# Patient Record
Sex: FEMALE | Race: WHITE | NOT HISPANIC OR LATINO | Employment: FULL TIME | RURAL
[De-identification: names, ages, dates, MRNs, and addresses within clinical notes are randomized per-mention and may not be internally consistent; named-entity substitution may affect disease eponyms.]

---

## 2018-06-05 ENCOUNTER — HISTORICAL (OUTPATIENT)
Dept: ADMINISTRATIVE | Facility: HOSPITAL | Age: 43
End: 2018-06-05

## 2018-06-07 LAB
LAB AP GENERAL CAT - HISTORICAL: NORMAL
LAB AP INTERPRETATION/RESULT - HISTORICAL: NEGATIVE
LAB AP SPECIMEN ADEQUACY - HISTORICAL: NORMAL
LAB AP SPECIMEN SUBMITTED - HISTORICAL: NORMAL

## 2018-07-09 ENCOUNTER — HISTORICAL (OUTPATIENT)
Dept: ADMINISTRATIVE | Facility: HOSPITAL | Age: 43
End: 2018-07-09

## 2018-07-11 LAB
LAB AP CLINICAL INFORMATION: NORMAL
LAB AP DIAGNOSIS - HISTORICAL: NORMAL
LAB AP GROSS PATHOLOGY - HISTORICAL: NORMAL
LAB AP SPECIMEN SUBMITTED - HISTORICAL: NORMAL

## 2018-07-18 ENCOUNTER — HISTORICAL (OUTPATIENT)
Dept: ADMINISTRATIVE | Facility: HOSPITAL | Age: 43
End: 2018-07-18

## 2020-11-24 ENCOUNTER — HISTORICAL (OUTPATIENT)
Dept: ADMINISTRATIVE | Facility: HOSPITAL | Age: 45
End: 2020-11-24

## 2020-11-24 LAB
ALBUMIN SERPL BCP-MCNC: 4.1 G/DL (ref 3.4–5)
ALBUMIN/GLOB SERPL: 1 {RATIO}
ALP SERPL-CCNC: 154 U/L (ref 50–136)
ALT SERPL W P-5'-P-CCNC: 32 U/L (ref 12–78)
ANION GAP SERPL CALCULATED.3IONS-SCNC: 14 MMOL/L
AST SERPL W P-5'-P-CCNC: 24 U/L (ref 15–37)
BACTERIA #/AREA URNS HPF: ABNORMAL /HPF
BASOPHILS # BLD AUTO: 0.03 X10E3/UL (ref 0–0.2)
BASOPHILS NFR BLD AUTO: 0.3 % (ref 0–1)
BILIRUB SERPL-MCNC: 0.3 MG/DL (ref 0.2–1)
BILIRUB UR QL STRIP: NEGATIVE MG/DL
BUN SERPL-MCNC: 19 MG/DL (ref 7–18)
CALCIUM SERPL-MCNC: 8.4 MG/DL (ref 8.5–10.1)
CAOX CRY #/AREA URNS LPF: ABNORMAL /LPF
CHLORIDE SERPL-SCNC: 107 MMOL/L (ref 101–111)
CLARITY UR: ABNORMAL
CLARITY UR: ABNORMAL
CO2 SERPL-SCNC: 28 MMOL/L (ref 21–32)
COLOR UR: YELLOW
COLOR UR: YELLOW
CREAT SERPL-MCNC: 1.2 MG/DL (ref 0.6–1.3)
EOSINOPHIL # BLD AUTO: 0.12 X10E3/UL (ref 0–0.5)
EOSINOPHIL NFR BLD AUTO: 1.2 % (ref 1–4)
ERYTHROCYTE [DISTWIDTH] IN BLOOD BY AUTOMATED COUNT: 14.6 % (ref 11.5–14.5)
GLOBULIN SER-MCNC: 3.5 G/DL
GLUCOSE SERPL-MCNC: 129 MG/DL (ref 74–106)
GLUCOSE UR STRIP-MCNC: NORMAL MG/DL
HCT VFR BLD AUTO: 39.5 % (ref 38–47)
HGB BLD-MCNC: 12.8 G/DL (ref 12–16)
KETONES UR STRIP-SCNC: NEGATIVE MG/DL
LEUKOCYTE ESTERASE UR QL STRIP: ABNORMAL LEU/UL
LYMPHOCYTES # BLD AUTO: 2.94 X10E3/UL (ref 1–4.8)
LYMPHOCYTES NFR BLD AUTO: 30.3 % (ref 27–41)
MCH RBC QN AUTO: 27.8 PG (ref 27–31)
MCHC RBC AUTO-ENTMCNC: 32.4 G/DL (ref 32–36)
MCV RBC AUTO: 86 FL (ref 80–96)
MONOCYTES # BLD AUTO: 0.7 X10E3/UL (ref 0–0.8)
MONOCYTES NFR BLD AUTO: 7.2 % (ref 2–6)
MPC BLD CALC-MCNC: 9.8 FL (ref 9.4–12.4)
MUCOUS THREADS #/AREA URNS HPF: ABNORMAL /HPF
NEUTROPHILS # BLD AUTO: 5.91 X10E3/UL (ref 1.8–7.7)
NEUTROPHILS NFR BLD AUTO: 61 % (ref 53–65)
NITRITE UR QL STRIP: NEGATIVE
PH UR STRIP: 6 PH UNITS (ref 5–8)
PLATELET # BLD AUTO: 365 X10E3/UL (ref 150–400)
POTASSIUM SERPL-SCNC: 4.3 MMOL/L (ref 3.6–5)
PROT SERPL-MCNC: 7.6 G/DL (ref 6.4–8.2)
PROT UR QL STRIP: ABNORMAL MG/DL
RBC # BLD AUTO: 4.6 X10E6/UL (ref 4.2–5.4)
RBC # UR STRIP: ABNORMAL ERY/UL
RBC #/AREA URNS HPF: ABNORMAL /HPF (ref 0–3)
SODIUM SERPL-SCNC: 145 MMOL/L (ref 135–145)
SP GR UR STRIP: >=1.03 (ref 1–1.03)
SQUAMOUS #/AREA URNS LPF: ABNORMAL /LPF
UROBILINOGEN UR STRIP-ACNC: 0.2 MG/DL
WBC # BLD AUTO: 9.7 X10E3/UL (ref 4.5–11)
WBC #/AREA URNS HPF: ABNORMAL /HPF (ref 0–5)

## 2020-11-25 ENCOUNTER — HISTORICAL (OUTPATIENT)
Dept: ADMINISTRATIVE | Facility: HOSPITAL | Age: 45
End: 2020-11-25

## 2020-11-27 LAB
REPORT: NORMAL

## 2021-05-19 RX ORDER — ERENUMAB-AOOE 70 MG/ML
70 INJECTION SUBCUTANEOUS
Qty: 3 SYRINGE | Refills: 1 | Status: SHIPPED | OUTPATIENT
Start: 2021-05-19 | End: 2021-08-09

## 2021-06-23 DIAGNOSIS — G43.919 INTRACTABLE MIGRAINE WITHOUT STATUS MIGRAINOSUS, UNSPECIFIED MIGRAINE TYPE: Primary | ICD-10-CM

## 2021-06-23 RX ORDER — SUMATRIPTAN SUCCINATE 100 MG/1
100 TABLET ORAL DAILY
Qty: 30 TABLET | Refills: 5 | Status: SHIPPED | OUTPATIENT
Start: 2021-06-23 | End: 2021-08-09

## 2021-06-23 RX ORDER — SUMATRIPTAN SUCCINATE 100 MG/1
1 TABLET ORAL DAILY
COMMUNITY
Start: 2021-04-26 | End: 2021-06-23 | Stop reason: SDUPTHER

## 2021-08-09 ENCOUNTER — OFFICE VISIT (OUTPATIENT)
Dept: FAMILY MEDICINE | Facility: CLINIC | Age: 46
End: 2021-08-09
Payer: OTHER GOVERNMENT

## 2021-08-09 VITALS
TEMPERATURE: 97 F | HEART RATE: 82 BPM | OXYGEN SATURATION: 97 % | SYSTOLIC BLOOD PRESSURE: 118 MMHG | HEIGHT: 65 IN | RESPIRATION RATE: 18 BRPM | DIASTOLIC BLOOD PRESSURE: 78 MMHG | BODY MASS INDEX: 33.32 KG/M2 | WEIGHT: 200 LBS

## 2021-08-09 DIAGNOSIS — U07.1 COVID-19: Primary | ICD-10-CM

## 2021-08-09 DIAGNOSIS — R05.9 COUGH: ICD-10-CM

## 2021-08-09 DIAGNOSIS — Z20.828 EXPOSURE TO SARS-ASSOCIATED CORONAVIRUS: ICD-10-CM

## 2021-08-09 LAB
CTP QC/QA: YES
FLUAV AG NPH QL: NEGATIVE
FLUBV AG NPH QL: NEGATIVE
SARS-COV-2 AG RESP QL IA.RAPID: POSITIVE

## 2021-08-09 PROCEDURE — 99213 PR OFFICE/OUTPT VISIT, EST, LEVL III, 20-29 MIN: ICD-10-PCS | Mod: ,,, | Performed by: NURSE PRACTITIONER

## 2021-08-09 PROCEDURE — 87428 SARSCOV & INF VIR A&B AG IA: CPT | Mod: QW,,, | Performed by: NURSE PRACTITIONER

## 2021-08-09 PROCEDURE — 87428 POCT SARS-COV2 (COVID) WITH FLU ANTIGEN: ICD-10-PCS | Mod: QW,,, | Performed by: NURSE PRACTITIONER

## 2021-08-09 PROCEDURE — 99213 OFFICE O/P EST LOW 20 MIN: CPT | Mod: ,,, | Performed by: NURSE PRACTITIONER

## 2021-08-09 RX ORDER — ALBUTEROL SULFATE 90 UG/1
2 AEROSOL, METERED RESPIRATORY (INHALATION) EVERY 6 HOURS PRN
Qty: 18 G | Refills: 0 | Status: SHIPPED | OUTPATIENT
Start: 2021-08-09 | End: 2022-09-06

## 2021-08-09 RX ORDER — RIZATRIPTAN BENZOATE 10 MG/1
TABLET, ORALLY DISINTEGRATING ORAL
COMMUNITY
Start: 2021-07-28 | End: 2021-12-06 | Stop reason: SDUPTHER

## 2021-08-09 RX ORDER — METHYLPREDNISOLONE 4 MG/1
TABLET ORAL
Qty: 1 PACKAGE | Refills: 0 | Status: SHIPPED | OUTPATIENT
Start: 2021-08-09 | End: 2021-08-30

## 2021-08-09 RX ORDER — TOPIRAMATE 50 MG/1
50 TABLET, FILM COATED ORAL NIGHTLY
COMMUNITY
Start: 2021-06-23 | End: 2021-11-03

## 2021-08-09 RX ORDER — ESCITALOPRAM OXALATE 20 MG/1
20 TABLET ORAL DAILY
COMMUNITY
Start: 2021-04-27

## 2021-08-09 RX ORDER — BENZONATATE 200 MG/1
200 CAPSULE ORAL 3 TIMES DAILY PRN
Qty: 30 CAPSULE | Refills: 0 | Status: SHIPPED | OUTPATIENT
Start: 2021-08-09 | End: 2021-08-19

## 2021-08-11 ENCOUNTER — HOSPITAL ENCOUNTER (EMERGENCY)
Facility: HOSPITAL | Age: 46
Discharge: HOME | End: 2021-08-12
Payer: OTHER GOVERNMENT

## 2021-08-11 DIAGNOSIS — R55 SYNCOPE, UNSPECIFIED SYNCOPE TYPE: ICD-10-CM

## 2021-08-11 DIAGNOSIS — U07.1 COVID-19: Primary | ICD-10-CM

## 2021-08-11 DIAGNOSIS — E86.0 DEHYDRATION: ICD-10-CM

## 2021-08-11 DIAGNOSIS — E66.9 OBESITY (BMI 30-39.9): ICD-10-CM

## 2021-08-11 LAB
ALBUMIN SERPL BCP-MCNC: 3.7 G/DL (ref 3.5–5)
ALBUMIN/GLOB SERPL: 0.9 {RATIO}
ALP SERPL-CCNC: 83 U/L (ref 37–98)
ALT SERPL W P-5'-P-CCNC: 30 U/L (ref 13–56)
ANION GAP SERPL CALCULATED.3IONS-SCNC: 14 MMOL/L (ref 7–16)
AST SERPL W P-5'-P-CCNC: 22 U/L (ref 15–37)
BASOPHILS # BLD AUTO: 0.01 K/UL (ref 0–0.2)
BASOPHILS NFR BLD AUTO: 0.2 % (ref 0–1)
BILIRUB SERPL-MCNC: 0.3 MG/DL (ref 0–1.2)
BUN SERPL-MCNC: 15 MG/DL (ref 7–18)
BUN/CREAT SERPL: 12 (ref 6–20)
CALCIUM SERPL-MCNC: 7.8 MG/DL (ref 8.5–10.1)
CHLORIDE SERPL-SCNC: 98 MMOL/L (ref 98–107)
CO2 SERPL-SCNC: 27 MMOL/L (ref 21–32)
CREAT SERPL-MCNC: 1.25 MG/DL (ref 0.55–1.02)
DIFFERENTIAL METHOD BLD: ABNORMAL
EOSINOPHIL # BLD AUTO: 0 K/UL (ref 0–0.5)
EOSINOPHIL NFR BLD AUTO: 0 % (ref 1–4)
ERYTHROCYTE [DISTWIDTH] IN BLOOD BY AUTOMATED COUNT: 15.1 % (ref 11.5–14.5)
GLOBULIN SER-MCNC: 3.9 G/DL (ref 2–4)
GLUCOSE SERPL-MCNC: 173 MG/DL (ref 74–106)
HCT VFR BLD AUTO: 39 % (ref 38–47)
HGB BLD-MCNC: 13.2 G/DL (ref 12–16)
LYMPHOCYTES # BLD AUTO: 0.69 K/UL (ref 1–4.8)
LYMPHOCYTES NFR BLD AUTO: 10.7 % (ref 27–41)
MCH RBC QN AUTO: 27.6 PG (ref 27–31)
MCHC RBC AUTO-ENTMCNC: 33.8 G/DL (ref 32–36)
MCV RBC AUTO: 81.6 FL (ref 80–96)
MONOCYTES # BLD AUTO: 0.48 K/UL (ref 0–0.8)
MONOCYTES NFR BLD AUTO: 7.4 % (ref 2–6)
MPC BLD CALC-MCNC: 9.2 FL (ref 9.4–12.4)
NEUTROPHILS # BLD AUTO: 5.28 K/UL (ref 1.8–7.7)
NEUTROPHILS NFR BLD AUTO: 81.7 % (ref 53–65)
PLATELET # BLD AUTO: 248 K/UL (ref 150–400)
POTASSIUM SERPL-SCNC: 3.7 MMOL/L (ref 3.5–5.1)
PROT SERPL-MCNC: 7.6 G/DL (ref 6.4–8.2)
RBC # BLD AUTO: 4.78 M/UL (ref 4.2–5.4)
SODIUM SERPL-SCNC: 135 MMOL/L (ref 136–145)
WBC # BLD AUTO: 6.46 K/UL (ref 4.5–11)

## 2021-08-11 PROCEDURE — 99284 EMERGENCY DEPT VISIT MOD MDM: CPT | Mod: 25

## 2021-08-11 PROCEDURE — 80053 COMPREHEN METABOLIC PANEL: CPT | Performed by: NURSE PRACTITIONER

## 2021-08-11 PROCEDURE — 99283 PR EMERGENCY DEPT VISIT,LEVEL III: ICD-10-PCS | Mod: ,,, | Performed by: NURSE PRACTITIONER

## 2021-08-11 PROCEDURE — 36415 COLL VENOUS BLD VENIPUNCTURE: CPT | Performed by: NURSE PRACTITIONER

## 2021-08-11 PROCEDURE — 96360 HYDRATION IV INFUSION INIT: CPT

## 2021-08-11 PROCEDURE — 99283 EMERGENCY DEPT VISIT LOW MDM: CPT | Mod: ,,, | Performed by: NURSE PRACTITIONER

## 2021-08-11 PROCEDURE — 85025 COMPLETE CBC W/AUTO DIFF WBC: CPT | Performed by: NURSE PRACTITIONER

## 2021-08-12 ENCOUNTER — INFUSION (OUTPATIENT)
Dept: INFECTIOUS DISEASES | Facility: HOSPITAL | Age: 46
End: 2021-08-12
Attending: NURSE PRACTITIONER
Payer: OTHER GOVERNMENT

## 2021-08-12 VITALS
RESPIRATION RATE: 20 BRPM | OXYGEN SATURATION: 96 % | DIASTOLIC BLOOD PRESSURE: 59 MMHG | BODY MASS INDEX: 33.32 KG/M2 | SYSTOLIC BLOOD PRESSURE: 109 MMHG | HEIGHT: 65 IN | TEMPERATURE: 97 F | WEIGHT: 200 LBS | HEART RATE: 77 BPM

## 2021-08-12 VITALS
HEIGHT: 65 IN | SYSTOLIC BLOOD PRESSURE: 97 MMHG | RESPIRATION RATE: 20 BRPM | HEART RATE: 71 BPM | BODY MASS INDEX: 33.32 KG/M2 | TEMPERATURE: 97 F | DIASTOLIC BLOOD PRESSURE: 58 MMHG | WEIGHT: 200 LBS | OXYGEN SATURATION: 96 %

## 2021-08-12 DIAGNOSIS — U07.1 COVID-19: Primary | ICD-10-CM

## 2021-08-12 PROCEDURE — 96365 THER/PROPH/DIAG IV INF INIT: CPT

## 2021-08-12 PROCEDURE — 63600175 PHARM REV CODE 636 W HCPCS: Performed by: NURSE PRACTITIONER

## 2021-08-12 PROCEDURE — M0243 CASIRIVI AND IMDEVI INFUSION: HCPCS | Performed by: NURSE PRACTITIONER

## 2021-08-12 PROCEDURE — 25000003 PHARM REV CODE 250: Performed by: NURSE PRACTITIONER

## 2021-08-12 RX ORDER — EPINEPHRINE 0.3 MG/.3ML
0.3 INJECTION SUBCUTANEOUS
Status: DISCONTINUED | OUTPATIENT
Start: 2021-08-12 | End: 2021-09-15

## 2021-08-12 RX ORDER — SODIUM CHLORIDE 0.9 % (FLUSH) 0.9 %
10 SYRINGE (ML) INJECTION
Status: DISCONTINUED | OUTPATIENT
Start: 2021-08-12 | End: 2021-09-15

## 2021-08-12 RX ORDER — ALBUTEROL SULFATE 90 UG/1
2 AEROSOL, METERED RESPIRATORY (INHALATION)
Status: DISCONTINUED | OUTPATIENT
Start: 2021-08-12 | End: 2023-01-12 | Stop reason: HOSPADM

## 2021-08-12 RX ORDER — DIPHENHYDRAMINE HYDROCHLORIDE 50 MG/ML
25 INJECTION INTRAMUSCULAR; INTRAVENOUS ONCE AS NEEDED
Status: DISCONTINUED | OUTPATIENT
Start: 2021-08-12 | End: 2021-09-15

## 2021-08-12 RX ORDER — ONDANSETRON 4 MG/1
4 TABLET, ORALLY DISINTEGRATING ORAL ONCE AS NEEDED
Status: DISCONTINUED | OUTPATIENT
Start: 2021-08-12 | End: 2021-09-15

## 2021-08-12 RX ORDER — ACETAMINOPHEN 325 MG/1
650 TABLET ORAL ONCE AS NEEDED
Status: DISCONTINUED | OUTPATIENT
Start: 2021-08-12 | End: 2023-01-12 | Stop reason: HOSPADM

## 2021-08-12 RX ADMIN — CASIRIVIMAB AND IMDEVIMAB 600 MG: 600; 600 INJECTION, SOLUTION, CONCENTRATE INTRAVENOUS at 01:08

## 2021-08-12 RX ADMIN — SODIUM CHLORIDE 500 ML: 9 INJECTION, SOLUTION INTRAVENOUS at 12:08

## 2021-09-15 ENCOUNTER — OFFICE VISIT (OUTPATIENT)
Dept: FAMILY MEDICINE | Facility: CLINIC | Age: 46
End: 2021-09-15
Payer: OTHER GOVERNMENT

## 2021-09-15 VITALS
DIASTOLIC BLOOD PRESSURE: 68 MMHG | OXYGEN SATURATION: 99 % | SYSTOLIC BLOOD PRESSURE: 100 MMHG | BODY MASS INDEX: 34.66 KG/M2 | TEMPERATURE: 97 F | RESPIRATION RATE: 18 BRPM | HEIGHT: 65 IN | HEART RATE: 99 BPM | WEIGHT: 208 LBS

## 2021-09-15 DIAGNOSIS — U07.1 PNEUMONIA DUE TO COVID-19 VIRUS: Primary | ICD-10-CM

## 2021-09-15 DIAGNOSIS — J18.9 PNEUMONIA OF RIGHT MIDDLE LOBE DUE TO INFECTIOUS ORGANISM: ICD-10-CM

## 2021-09-15 DIAGNOSIS — R05.9 COUGH: ICD-10-CM

## 2021-09-15 DIAGNOSIS — J12.82 PNEUMONIA DUE TO COVID-19 VIRUS: Primary | ICD-10-CM

## 2021-09-15 DIAGNOSIS — G91.9 HYDROCEPHALUS, UNSPECIFIED TYPE: ICD-10-CM

## 2021-09-15 DIAGNOSIS — R06.02 SHORTNESS OF BREATH: ICD-10-CM

## 2021-09-15 PROCEDURE — 99214 PR OFFICE/OUTPT VISIT, EST, LEVL IV, 30-39 MIN: ICD-10-PCS | Mod: 25,,, | Performed by: NURSE PRACTITIONER

## 2021-09-15 PROCEDURE — 96372 THER/PROPH/DIAG INJ SC/IM: CPT | Mod: ,,, | Performed by: NURSE PRACTITIONER

## 2021-09-15 PROCEDURE — 93005 PR ELECTROCARDIOGRAM, TRACING: ICD-10-PCS | Mod: ,,, | Performed by: NURSE PRACTITIONER

## 2021-09-15 PROCEDURE — 99214 OFFICE O/P EST MOD 30 MIN: CPT | Mod: 25,,, | Performed by: NURSE PRACTITIONER

## 2021-09-15 PROCEDURE — 96372 PR INJECTION,THERAP/PROPH/DIAG2ST, IM OR SUBCUT: ICD-10-PCS | Mod: ,,, | Performed by: NURSE PRACTITIONER

## 2021-09-15 PROCEDURE — 93005 ELECTROCARDIOGRAM TRACING: CPT | Mod: ,,, | Performed by: NURSE PRACTITIONER

## 2021-09-15 RX ORDER — METHYLPREDNISOLONE ACETATE 40 MG/ML
40 INJECTION, SUSPENSION INTRA-ARTICULAR; INTRALESIONAL; INTRAMUSCULAR; SOFT TISSUE
Status: COMPLETED | OUTPATIENT
Start: 2021-09-15 | End: 2021-09-15

## 2021-09-15 RX ORDER — BUDESONIDE AND FORMOTEROL FUMARATE DIHYDRATE 160; 4.5 UG/1; UG/1
2 AEROSOL RESPIRATORY (INHALATION) EVERY 12 HOURS
Qty: 10 G | Refills: 0 | Status: SHIPPED | OUTPATIENT
Start: 2021-09-15 | End: 2021-10-26

## 2021-09-15 RX ORDER — CEFTRIAXONE 1 G/1
1 INJECTION, POWDER, FOR SOLUTION INTRAMUSCULAR; INTRAVENOUS
Status: COMPLETED | OUTPATIENT
Start: 2021-09-15 | End: 2021-09-15

## 2021-09-15 RX ORDER — DOXYCYCLINE 100 MG/1
100 CAPSULE ORAL 2 TIMES DAILY
Qty: 20 CAPSULE | Refills: 0 | Status: SHIPPED | OUTPATIENT
Start: 2021-09-15 | End: 2021-10-26

## 2021-09-15 RX ORDER — METHYLPREDNISOLONE 4 MG/1
TABLET ORAL
Qty: 1 PACKAGE | Refills: 0 | Status: SHIPPED | OUTPATIENT
Start: 2021-09-15 | End: 2021-10-06

## 2021-09-15 RX ADMIN — CEFTRIAXONE 1 G: 1 INJECTION, POWDER, FOR SOLUTION INTRAMUSCULAR; INTRAVENOUS at 05:09

## 2021-09-15 RX ADMIN — METHYLPREDNISOLONE ACETATE 40 MG: 40 INJECTION, SUSPENSION INTRA-ARTICULAR; INTRALESIONAL; INTRAMUSCULAR; SOFT TISSUE at 05:09

## 2021-10-11 DIAGNOSIS — U07.1 PNEUMONIA DUE TO COVID-19 VIRUS: Primary | ICD-10-CM

## 2021-10-11 DIAGNOSIS — J12.82 PNEUMONIA DUE TO COVID-19 VIRUS: Primary | ICD-10-CM

## 2021-10-20 ENCOUNTER — HOSPITAL ENCOUNTER (OUTPATIENT)
Dept: RADIOLOGY | Facility: HOSPITAL | Age: 46
Discharge: HOME | End: 2021-10-20
Attending: INTERNAL MEDICINE
Payer: OTHER GOVERNMENT

## 2021-10-20 ENCOUNTER — OFFICE VISIT (OUTPATIENT)
Dept: PULMONOLOGY | Facility: CLINIC | Age: 46
End: 2021-10-20
Payer: OTHER GOVERNMENT

## 2021-10-20 VITALS
DIASTOLIC BLOOD PRESSURE: 80 MMHG | OXYGEN SATURATION: 98 % | SYSTOLIC BLOOD PRESSURE: 118 MMHG | RESPIRATION RATE: 16 BRPM | HEIGHT: 65 IN | HEART RATE: 66 BPM | WEIGHT: 200 LBS | BODY MASS INDEX: 33.32 KG/M2

## 2021-10-20 DIAGNOSIS — U07.1 PNEUMONIA DUE TO COVID-19 VIRUS: ICD-10-CM

## 2021-10-20 DIAGNOSIS — J12.82 PNEUMONIA DUE TO COVID-19 VIRUS: ICD-10-CM

## 2021-10-20 DIAGNOSIS — B34.2 CORONAVIRUS INFECTION: ICD-10-CM

## 2021-10-20 PROCEDURE — 71046 X-RAY EXAM CHEST 2 VIEWS: CPT | Mod: TC

## 2021-10-20 PROCEDURE — 71046 X-RAY EXAM CHEST 2 VIEWS: CPT | Mod: 26,,, | Performed by: RADIOLOGY

## 2021-10-20 PROCEDURE — 99202 PR OFFICE/OUTPT VISIT, NEW, LEVL II, 15-29 MIN: ICD-10-PCS | Mod: S$PBB,,, | Performed by: INTERNAL MEDICINE

## 2021-10-20 PROCEDURE — 99215 OFFICE O/P EST HI 40 MIN: CPT | Mod: PBBFAC,25 | Performed by: INTERNAL MEDICINE

## 2021-10-20 PROCEDURE — 71046 XR CHEST PA AND LATERAL: ICD-10-PCS | Mod: 26,,, | Performed by: RADIOLOGY

## 2021-10-20 PROCEDURE — 99202 OFFICE O/P NEW SF 15 MIN: CPT | Mod: S$PBB,,, | Performed by: INTERNAL MEDICINE

## 2021-10-26 RX ORDER — SILVER SULFADIAZINE 10 G/1000G
CREAM TOPICAL 2 TIMES DAILY
Qty: 25 G | Refills: 0 | Status: SHIPPED | OUTPATIENT
Start: 2021-10-26 | End: 2021-12-06

## 2021-11-03 ENCOUNTER — TELEPHONE (OUTPATIENT)
Dept: NEUROLOGY | Facility: CLINIC | Age: 46
End: 2021-11-03
Payer: OTHER GOVERNMENT

## 2021-11-03 ENCOUNTER — OFFICE VISIT (OUTPATIENT)
Dept: NEUROLOGY | Facility: CLINIC | Age: 46
End: 2021-11-03
Payer: OTHER GOVERNMENT

## 2021-11-03 VITALS
HEART RATE: 76 BPM | SYSTOLIC BLOOD PRESSURE: 142 MMHG | HEIGHT: 65 IN | DIASTOLIC BLOOD PRESSURE: 92 MMHG | RESPIRATION RATE: 18 BRPM | BODY MASS INDEX: 33.32 KG/M2 | OXYGEN SATURATION: 99 % | WEIGHT: 200 LBS

## 2021-11-03 DIAGNOSIS — G91.9 HYDROCEPHALUS, UNSPECIFIED TYPE: ICD-10-CM

## 2021-11-03 DIAGNOSIS — E55.9 VITAMIN D DEFICIENCY: ICD-10-CM

## 2021-11-03 DIAGNOSIS — H53.8 BLURRED VISION, BILATERAL: ICD-10-CM

## 2021-11-03 DIAGNOSIS — E03.9 HYPOTHYROIDISM, UNSPECIFIED TYPE: ICD-10-CM

## 2021-11-03 DIAGNOSIS — E55.9 VITAMIN D DEFICIENCY: Primary | ICD-10-CM

## 2021-11-03 DIAGNOSIS — G43.719 INTRACTABLE CHRONIC MIGRAINE WITHOUT AURA AND WITHOUT STATUS MIGRAINOSUS: Primary | ICD-10-CM

## 2021-11-03 DIAGNOSIS — E53.8 VITAMIN B12 DEFICIENCY: ICD-10-CM

## 2021-11-03 PROCEDURE — 99215 OFFICE O/P EST HI 40 MIN: CPT | Mod: PBBFAC | Performed by: NURSE PRACTITIONER

## 2021-11-03 PROCEDURE — 99204 PR OFFICE/OUTPT VISIT, NEW, LEVL IV, 45-59 MIN: ICD-10-PCS | Mod: S$PBB,,, | Performed by: NURSE PRACTITIONER

## 2021-11-03 PROCEDURE — 99204 OFFICE O/P NEW MOD 45 MIN: CPT | Mod: S$PBB,,, | Performed by: NURSE PRACTITIONER

## 2021-11-03 RX ORDER — ERGOCALCIFEROL 1.25 MG/1
CAPSULE ORAL
Qty: 12 CAPSULE | Refills: 2 | Status: SHIPPED | OUTPATIENT
Start: 2021-11-03

## 2021-11-03 RX ORDER — PROPRANOLOL HYDROCHLORIDE 20 MG/1
TABLET ORAL
Qty: 60 TABLET | Refills: 11 | Status: SHIPPED | OUTPATIENT
Start: 2021-11-03 | End: 2021-12-06

## 2021-11-16 DIAGNOSIS — G43.719 INTRACTABLE CHRONIC MIGRAINE WITHOUT AURA AND WITHOUT STATUS MIGRAINOSUS: Primary | ICD-10-CM

## 2021-12-02 ENCOUNTER — TELEPHONE (OUTPATIENT)
Dept: NEUROLOGY | Facility: CLINIC | Age: 46
End: 2021-12-02
Payer: OTHER GOVERNMENT

## 2021-12-02 DIAGNOSIS — G91.9 HYDROCEPHALUS, UNSPECIFIED TYPE: Primary | ICD-10-CM

## 2021-12-03 DIAGNOSIS — G91.9 HYDROCEPHALUS, UNSPECIFIED TYPE: Primary | ICD-10-CM

## 2021-12-06 ENCOUNTER — OFFICE VISIT (OUTPATIENT)
Dept: FAMILY MEDICINE | Facility: CLINIC | Age: 46
End: 2021-12-06
Payer: OTHER GOVERNMENT

## 2021-12-06 VITALS
HEART RATE: 70 BPM | BODY MASS INDEX: 33.78 KG/M2 | DIASTOLIC BLOOD PRESSURE: 80 MMHG | OXYGEN SATURATION: 99 % | WEIGHT: 203 LBS | SYSTOLIC BLOOD PRESSURE: 130 MMHG

## 2021-12-06 DIAGNOSIS — G43.719 INTRACTABLE CHRONIC MIGRAINE WITHOUT AURA AND WITHOUT STATUS MIGRAINOSUS: Primary | ICD-10-CM

## 2021-12-06 PROCEDURE — 99213 PR OFFICE/OUTPT VISIT, EST, LEVL III, 20-29 MIN: ICD-10-PCS | Mod: 25,,, | Performed by: NURSE PRACTITIONER

## 2021-12-06 PROCEDURE — 96372 THER/PROPH/DIAG INJ SC/IM: CPT | Mod: ,,, | Performed by: NURSE PRACTITIONER

## 2021-12-06 PROCEDURE — 99213 OFFICE O/P EST LOW 20 MIN: CPT | Mod: 25,,, | Performed by: NURSE PRACTITIONER

## 2021-12-06 PROCEDURE — 96372 PR INJECTION,THERAP/PROPH/DIAG2ST, IM OR SUBCUT: ICD-10-PCS | Mod: ,,, | Performed by: NURSE PRACTITIONER

## 2021-12-06 RX ORDER — KETOROLAC TROMETHAMINE 30 MG/ML
60 INJECTION, SOLUTION INTRAMUSCULAR; INTRAVENOUS
Status: COMPLETED | OUTPATIENT
Start: 2021-12-06 | End: 2021-12-06

## 2021-12-06 RX ORDER — RIZATRIPTAN BENZOATE 10 MG/1
TABLET, ORALLY DISINTEGRATING ORAL
Qty: 9 TABLET | Refills: 5 | Status: SHIPPED | OUTPATIENT
Start: 2021-12-06 | End: 2022-04-29 | Stop reason: SDUPTHER

## 2021-12-06 RX ADMIN — KETOROLAC TROMETHAMINE 60 MG: 30 INJECTION, SOLUTION INTRAMUSCULAR; INTRAVENOUS at 01:12

## 2021-12-13 RX ORDER — LISINOPRIL 10 MG/1
10 TABLET ORAL DAILY
Qty: 30 TABLET | Refills: 0 | Status: SHIPPED | OUTPATIENT
Start: 2021-12-13 | End: 2022-09-06 | Stop reason: SDUPTHER

## 2021-12-29 ENCOUNTER — OFFICE VISIT (OUTPATIENT)
Dept: NEUROLOGY | Facility: CLINIC | Age: 46
End: 2021-12-29
Payer: OTHER GOVERNMENT

## 2021-12-29 VITALS
WEIGHT: 203 LBS | HEART RATE: 86 BPM | RESPIRATION RATE: 18 BRPM | OXYGEN SATURATION: 98 % | DIASTOLIC BLOOD PRESSURE: 82 MMHG | HEIGHT: 65 IN | SYSTOLIC BLOOD PRESSURE: 136 MMHG | BODY MASS INDEX: 33.82 KG/M2

## 2021-12-29 DIAGNOSIS — E55.9 VITAMIN D DEFICIENCY: ICD-10-CM

## 2021-12-29 DIAGNOSIS — G91.9 HYDROCEPHALUS, UNSPECIFIED TYPE: ICD-10-CM

## 2021-12-29 DIAGNOSIS — E53.8 VITAMIN B12 DEFICIENCY: ICD-10-CM

## 2021-12-29 DIAGNOSIS — G43.719 INTRACTABLE CHRONIC MIGRAINE WITHOUT AURA AND WITHOUT STATUS MIGRAINOSUS: Primary | Chronic | ICD-10-CM

## 2021-12-29 PROCEDURE — 99214 OFFICE O/P EST MOD 30 MIN: CPT | Mod: S$PBB,,, | Performed by: NURSE PRACTITIONER

## 2021-12-29 PROCEDURE — 99214 PR OFFICE/OUTPT VISIT, EST, LEVL IV, 30-39 MIN: ICD-10-PCS | Mod: S$PBB,,, | Performed by: NURSE PRACTITIONER

## 2021-12-29 PROCEDURE — 99214 OFFICE O/P EST MOD 30 MIN: CPT | Mod: PBBFAC | Performed by: NURSE PRACTITIONER

## 2022-02-23 DIAGNOSIS — G43.719 INTRACTABLE CHRONIC MIGRAINE WITHOUT AURA AND WITHOUT STATUS MIGRAINOSUS: Primary | ICD-10-CM

## 2022-02-23 RX ORDER — FREMANEZUMAB-VFRM 225 MG/1.5ML
225 INJECTION SUBCUTANEOUS
Qty: 1 EACH | Refills: 2 | Status: SHIPPED | OUTPATIENT
Start: 2022-02-23 | End: 2022-03-23 | Stop reason: SDUPTHER

## 2022-03-23 DIAGNOSIS — G43.719 INTRACTABLE CHRONIC MIGRAINE WITHOUT AURA AND WITHOUT STATUS MIGRAINOSUS: Primary | ICD-10-CM

## 2022-03-23 RX ORDER — FREMANEZUMAB-VFRM 225 MG/1.5ML
675 INJECTION SUBCUTANEOUS
Qty: 3 EACH | Refills: 3 | Status: SHIPPED | OUTPATIENT
Start: 2022-03-23 | End: 2022-04-04 | Stop reason: SDUPTHER

## 2022-04-04 DIAGNOSIS — G43.719 INTRACTABLE CHRONIC MIGRAINE WITHOUT AURA AND WITHOUT STATUS MIGRAINOSUS: ICD-10-CM

## 2022-04-04 RX ORDER — FREMANEZUMAB-VFRM 225 MG/1.5ML
675 INJECTION SUBCUTANEOUS
Qty: 3 EACH | Refills: 3 | Status: SHIPPED | OUTPATIENT
Start: 2022-04-04

## 2022-04-07 ENCOUNTER — OFFICE VISIT (OUTPATIENT)
Dept: FAMILY MEDICINE | Facility: CLINIC | Age: 47
End: 2022-04-07
Payer: OTHER GOVERNMENT

## 2022-04-07 VITALS
TEMPERATURE: 97 F | SYSTOLIC BLOOD PRESSURE: 128 MMHG | DIASTOLIC BLOOD PRESSURE: 70 MMHG | WEIGHT: 212 LBS | OXYGEN SATURATION: 98 % | BODY MASS INDEX: 35.32 KG/M2 | RESPIRATION RATE: 18 BRPM | HEART RATE: 72 BPM | HEIGHT: 65 IN

## 2022-04-07 DIAGNOSIS — H93.8X3 EAR CONGESTION, BILATERAL: ICD-10-CM

## 2022-04-07 DIAGNOSIS — J32.1 FRONTAL SINUSITIS, UNSPECIFIED CHRONICITY: Primary | ICD-10-CM

## 2022-04-07 DIAGNOSIS — R05.9 COUGH: ICD-10-CM

## 2022-04-07 DIAGNOSIS — R09.81 HEAD CONGESTION: ICD-10-CM

## 2022-04-07 LAB
CTP QC/QA: YES
FLUAV AG NPH QL: NEGATIVE
FLUBV AG NPH QL: NEGATIVE
SARS-COV-2 AG RESP QL IA.RAPID: NEGATIVE

## 2022-04-07 PROCEDURE — 99213 OFFICE O/P EST LOW 20 MIN: CPT | Mod: 25,,, | Performed by: NURSE PRACTITIONER

## 2022-04-07 PROCEDURE — 96372 PR INJECTION,THERAP/PROPH/DIAG2ST, IM OR SUBCUT: ICD-10-PCS | Mod: ,,, | Performed by: NURSE PRACTITIONER

## 2022-04-07 PROCEDURE — 87428 SARSCOV & INF VIR A&B AG IA: CPT | Mod: QW,,, | Performed by: NURSE PRACTITIONER

## 2022-04-07 PROCEDURE — 99213 PR OFFICE/OUTPT VISIT, EST, LEVL III, 20-29 MIN: ICD-10-PCS | Mod: 25,,, | Performed by: NURSE PRACTITIONER

## 2022-04-07 PROCEDURE — 87428 POCT SARS-COV2 (COVID) WITH FLU ANTIGEN: ICD-10-PCS | Mod: QW,,, | Performed by: NURSE PRACTITIONER

## 2022-04-07 PROCEDURE — 96372 THER/PROPH/DIAG INJ SC/IM: CPT | Mod: ,,, | Performed by: NURSE PRACTITIONER

## 2022-04-07 RX ORDER — CEFTRIAXONE 1 G/1
1 INJECTION, POWDER, FOR SOLUTION INTRAMUSCULAR; INTRAVENOUS
Status: COMPLETED | OUTPATIENT
Start: 2022-04-07 | End: 2022-04-07

## 2022-04-07 RX ORDER — DEXBROMPHENIRAMINE MALEATE, PHENYLEPHRINE HYDROCHLORIDE 2; 7.5 MG/1; MG/1
1 TABLET ORAL
COMMUNITY
Start: 2021-12-11

## 2022-04-07 RX ORDER — DEXAMETHASONE SODIUM PHOSPHATE 4 MG/ML
4 INJECTION, SOLUTION INTRA-ARTICULAR; INTRALESIONAL; INTRAMUSCULAR; INTRAVENOUS; SOFT TISSUE
Status: COMPLETED | OUTPATIENT
Start: 2022-04-07 | End: 2022-04-07

## 2022-04-07 RX ORDER — CETIRIZINE HYDROCHLORIDE, PSEUDOEPHEDRINE HYDROCHLORIDE 5; 120 MG/1; MG/1
1 TABLET, FILM COATED, EXTENDED RELEASE ORAL 2 TIMES DAILY PRN
Qty: 30 TABLET | Refills: 0 | Status: SHIPPED | OUTPATIENT
Start: 2022-04-07 | End: 2022-04-17

## 2022-04-07 RX ORDER — PROPRANOLOL HYDROCHLORIDE 20 MG/1
TABLET ORAL
COMMUNITY
Start: 2021-11-03 | End: 2023-01-12

## 2022-04-07 RX ORDER — TOPIRAMATE 50 MG/1
TABLET, FILM COATED ORAL
COMMUNITY
Start: 2021-06-23

## 2022-04-07 RX ORDER — CEFDINIR 300 MG/1
300 CAPSULE ORAL 2 TIMES DAILY
Qty: 20 CAPSULE | Refills: 0 | Status: SHIPPED | OUTPATIENT
Start: 2022-04-07 | End: 2022-04-17

## 2022-04-07 RX ADMIN — CEFTRIAXONE 1 G: 1 INJECTION, POWDER, FOR SOLUTION INTRAMUSCULAR; INTRAVENOUS at 09:04

## 2022-04-07 RX ADMIN — DEXAMETHASONE SODIUM PHOSPHATE 4 MG: 4 INJECTION, SOLUTION INTRA-ARTICULAR; INTRALESIONAL; INTRAMUSCULAR; INTRAVENOUS; SOFT TISSUE at 09:04

## 2022-04-07 NOTE — LETTER
April 7, 2022      [facility]  [address]  [place] [address]  Phone: [phone]  Fax: [phone]       Patient: Ana Maria   YOB: 1975  Date of Visit: 04/07/2022    To Whom It May Concern:    Norma Maria  was at [facility] on 04/07/2022. The patient may return to work/school on 04/08/2022 with no restrictions. If you have any questions or concerns, or if I can be of further assistance, please do not hesitate to contact me.    Sincerely,    Selena Lay RN

## 2022-04-07 NOTE — PROGRESS NOTES
SHEY Christianson   [address] [place] [address]  [phone]      PATIENT NAME: Ana Maria  : 1975  DATE: 22  MRN: [id number]      Billing Provider: SHEY Christianson  Level of Service:   Patient PCP Information     Provider PCP Type    SHEY [facility]          Reason for Visit / Chief Complaint: Nasal Congestion, Sinus Problem, and Otalgia       Update PCP  Update Chief Complaint         History of Present Illness / Problem Focused Workflow       46 year old male presents with complaints of head and nasal congestion, otalgia for several days  Denies any known fever, n/v  Reports she has had allergy problems in the past      Review of Systems     Review of Systems   Constitutional: Positive for fatigue. Negative for chills and fever.   HENT: Positive for congestion, ear pain and sinus pressure. Negative for ear discharge and sore throat.    Respiratory: Positive for cough. Negative for shortness of breath.    Cardiovascular: Negative for chest pain.   Gastrointestinal: Negative for abdominal pain, diarrhea and nausea.   Musculoskeletal: Negative for gait problem.   Allergic/Immunologic: Positive for environmental allergies.   Neurological: Positive for headaches. Negative for dizziness and weakness.   Psychiatric/Behavioral: Negative for agitation and dysphoric mood.       Medical / Social / Family History     Past Medical History:   Diagnosis Date    COVID-19     21    Depression     Migraine        Past Surgical History:   Procedure Laterality Date    HERNIA REPAIR      umbilical hernia repair:     HYSTERECTOMY      PRE-MALIGNANT / BENIGN SKIN LESION EXCISION      sebaceous cyst       Social History  MsDevan  reports that she has never smoked. She has never used smokeless tobacco. She reports that she does not drink alcohol and does not use drugs.    Family History  Ms.'s family history includes Hypertension in her  father and mother; Migraines in her mother and sister.    Medications and Allergies     Medications  Outpatient Medications Marked as Taking for the 4/7/22 encounter (Office Visit) with SHEY Christianson   Medication Sig Dispense Refill    ALAHIST PE 2-7.5 mg Tab TAKE 1 TABLET BY MOUTH EVERY 4 TO 6 HOURS      ergocalciferol (ERGOCALCIFEROL) 50,000 unit Cap Take 1 capsule by mouth weekly for 12 weeks then take 1 capsule monthly after that 12 capsule 2    EScitalopram oxalate (LEXAPRO) 20 MG tablet Take 20 mg by mouth once daily.      fremanezumab-vfrm (AJOVY AUTOINJECTOR) 225 mg/1.5 mL autoinjector Inject 4.5 mLs (675 mg total) into the skin every 3 (three) months. 3 each 3    lisinopriL 10 MG tablet Take 1 tablet (10 mg total) by mouth once daily. 30 tablet 0    propranoloL (INDERAL) 20 MG tablet       rizatriptan (MAXALT-MLT) 10 MG disintegrating tablet TAKE 1 TABLET BY MOUTH at onset OF HEADACHE AS DIRECTED, MAY REPEAT every 2 hours UNTIL RELIEF UP TO a maximum OF 3 tablets in 24 HOURS 9 tablet 5    topiramate (TOPAMAX) 50 MG tablet        Current Facility-Administered Medications for the 4/7/22 encounter (Office Visit) with SHEY Christianson   Medication Dose Route Frequency Provider Last Rate Last Admin    acetaminophen tablet 650 mg  650 mg Oral Once PRN Brenda Palomares DNP, FNP        albuterol inhaler 2 puff  2 puff Inhalation Q20 Min PRN Brenda Palomares DNP, FNP        [COMPLETED] cefTRIAXone injection 1 g  1 g Intramuscular 1 time in Clinic/HOD SHEY Christianson   1 g at 04/07/22 0936    [COMPLETED] dexamethasone injection 4 mg  4 mg Intramuscular 1 time in Clinic/HOD SHEY Christianson   4 mg at 04/07/22 0937       Allergies  Review of patient's allergies indicates:   Allergen Reactions    Azithromycin Itching    Betadine antiseptic gauze Hives       Physical Examination     Vitals:    04/07/22 0853   BP: 128/70   Pulse: 72   Resp: 18   Temp: 97 °F (36.1 °C)     Physical  Exam  Constitutional:       General: She is not in acute distress.  HENT:      Ears:      Comments: TM dull bilat     Nose: Congestion present. No rhinorrhea.      Mouth/Throat:      Mouth: Mucous membranes are moist.      Pharynx: Posterior oropharyngeal erythema present.   Eyes:      Extraocular Movements: Extraocular movements intact.   Cardiovascular:      Rate and Rhythm: Normal rate.   Pulmonary:      Effort: Pulmonary effort is normal. No respiratory distress.   Abdominal:      General: Bowel sounds are normal. There is no distension.   Musculoskeletal:         General: Normal range of motion.      Cervical back: Neck supple.   Skin:     General: Skin is warm.   Neurological:      Mental Status: She is alert and oriented to person, place, and time.   Psychiatric:         Behavior: Behavior normal.           Imaging / Labs     Office Visit on 04/07/2022   Component Date Value Ref Range Status    SARS Coronavirus 2 Antigen 04/07/2022 Negative  Negative Final    Rapid Influenza A Ag 04/07/2022 Negative  Negative Final    Rapid Influenza B Ag 04/07/2022 Negative  Negative Final     Acceptable 04/07/2022 Yes   Final     MRI Brain W WO Contrast  Narrative: EXAMINATION:  MRI BRAIN W WO CONTRAST    CLINICAL HISTORY:  Headache, chronic, new features or increased frequency; Chronic migraine without aura, intractable, without status migrainosus    TECHNIQUE:  Axial sagittal and coronal imaging of the brain is performed without and with intravenous contrast. T1, T2, FLAIR and diffusion weighted sequences are performed. Contrast dose is 18 cc Dotarem.    COMPARISON:  CT 11 August 2021    FINDINGS:  No evidence of restricted diffusion seen.  No evidence of intracranial hemorrhage, mass, mass effect or shift is seen.    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  There is thinning of the parenchyma.  Two foci of white matter T2  signal hyperintensity present 5 mm or less in size present in the right cerebral hemisphere.  Remaining brain parenchyma has normal signal and differentiation.  The ventricles and cisterns are appropriate in caliber.    The mass effect from dilated ventricles compress the posterior fossa slightly inferior similar to previous CT.  Otherwise the posterior fossa, mid brain and pituitary gland appear within normal limits.  No evidence of cranial or skull base abnormality seen.    No areas of abnormal enhancement are present on the post-contrast images when compared to the pre-contrast study  Impression: Dilated lateral and 3rd ventricles with absence of the septum appears unchanged from CT.  Two foci of white matter T2 signal hyperintensity present in the right cerebellar hemisphere, nonspecific finding can be seen with migraine headaches.    Electronically signed by: Danilo Cole  Date:    12/02/2021  Time:    11:19      Assessment and Plan (including Health Maintenance)      Problem List  Smart Sets  Document Outside HM   :    Health Maintenance Due   Topic Date Due    Hepatitis C Screening  Never done    Lipid Panel  Never done    COVID-19 Vaccine (1) Never done    Pneumococcal Vaccines (Age 0-64) (1 of 2 - PPSV23) Never done    HIV Screening  Never done    TETANUS VACCINE  Never done    Mammogram  Never done    Influenza Vaccine (1) Never done       Problem List Items Addressed This Visit        ENT    Frontal sinusitis - Primary    Relevant Medications    cefTRIAXone injection 1 g (Completed)    dexamethasone injection 4 mg (Completed)    cefdinir (OMNICEF) 300 MG capsule    cetirizine-pseudoephedrine 5-120 mg Tb12      Other Visit Diagnoses     Cough        Relevant Medications    dexamethasone injection 4 mg (Completed)    Other Relevant Orders    POCT SARS-COV2 (COVID) with Flu Antigen (Completed)    Head congestion        Relevant Medications    dexamethasone injection 4 mg (Completed)     cetirizine-pseudoephedrine 5-120 mg Tb12    Ear congestion, bilateral        Relevant Medications    dexamethasone injection 4 mg (Completed)    cefdinir (OMNICEF) 300 MG capsule        Treat for sinusitis today. Increase fluids as tolerated  Zyrtec D BID prn  Follow up as needed or if symptoms fail to improve    Signature:  SHEY Christianson  [address] [address]  [phone]    Date of encounter: 4/7/22

## 2022-04-29 DIAGNOSIS — G43.719 INTRACTABLE CHRONIC MIGRAINE WITHOUT AURA AND WITHOUT STATUS MIGRAINOSUS: Primary | ICD-10-CM

## 2022-04-29 RX ORDER — RIZATRIPTAN BENZOATE 10 MG/1
TABLET, ORALLY DISINTEGRATING ORAL
Qty: 9 TABLET | Refills: 5 | Status: SHIPPED | OUTPATIENT
Start: 2022-04-29 | End: 2022-10-12 | Stop reason: SDUPTHER

## 2022-06-28 ENCOUNTER — CLINICAL SUPPORT (OUTPATIENT)
Dept: FAMILY MEDICINE | Facility: CLINIC | Age: 47
End: 2022-06-28
Payer: OTHER GOVERNMENT

## 2022-06-28 DIAGNOSIS — Z23 ENCOUNTER FOR IMMUNIZATION: Primary | ICD-10-CM

## 2022-06-28 PROCEDURE — 90715 TDAP VACCINE 7 YRS/> IM: CPT | Mod: ,,, | Performed by: NURSE PRACTITIONER

## 2022-06-28 PROCEDURE — 90471 TDAP VACCINE GREATER THAN OR EQUAL TO 7YO IM: ICD-10-PCS | Mod: ,,, | Performed by: NURSE PRACTITIONER

## 2022-06-28 PROCEDURE — 90471 IMMUNIZATION ADMIN: CPT | Mod: ,,, | Performed by: NURSE PRACTITIONER

## 2022-06-28 PROCEDURE — 90715 TDAP VACCINE GREATER THAN OR EQUAL TO 7YO IM: ICD-10-PCS | Mod: ,,, | Performed by: NURSE PRACTITIONER

## 2022-09-06 DIAGNOSIS — I10 ESSENTIAL HYPERTENSION, BENIGN: Primary | ICD-10-CM

## 2022-09-06 RX ORDER — LISINOPRIL 10 MG/1
14 TABLET ORAL DAILY
Qty: 30 TABLET | Refills: 0 | Status: SHIPPED | OUTPATIENT
Start: 2022-09-06 | End: 2023-01-12 | Stop reason: SDUPTHER

## 2022-10-09 DIAGNOSIS — I10 ESSENTIAL HYPERTENSION, BENIGN: ICD-10-CM

## 2022-10-09 DIAGNOSIS — G43.719 INTRACTABLE CHRONIC MIGRAINE WITHOUT AURA AND WITHOUT STATUS MIGRAINOSUS: ICD-10-CM

## 2022-10-12 DIAGNOSIS — G43.719 INTRACTABLE CHRONIC MIGRAINE WITHOUT AURA AND WITHOUT STATUS MIGRAINOSUS: ICD-10-CM

## 2022-10-12 RX ORDER — RIZATRIPTAN BENZOATE 10 MG/1
TABLET, ORALLY DISINTEGRATING ORAL
Qty: 9 TABLET | Refills: 5 | Status: SHIPPED | OUTPATIENT
Start: 2022-10-12 | End: 2023-01-19 | Stop reason: SDUPTHER

## 2022-10-20 RX ORDER — LISINOPRIL 10 MG/1
TABLET ORAL
Refills: 0 | OUTPATIENT
Start: 2022-10-20

## 2022-10-20 RX ORDER — RIZATRIPTAN BENZOATE 10 MG/1
TABLET, ORALLY DISINTEGRATING ORAL
Refills: 0 | OUTPATIENT
Start: 2022-10-20

## 2022-10-24 ENCOUNTER — OFFICE VISIT (OUTPATIENT)
Dept: FAMILY MEDICINE | Facility: CLINIC | Age: 47
End: 2022-10-24
Payer: OTHER GOVERNMENT

## 2022-10-24 DIAGNOSIS — I10 ESSENTIAL HYPERTENSION, BENIGN: ICD-10-CM

## 2022-10-24 DIAGNOSIS — M62.838 NECK MUSCLE SPASM: Primary | ICD-10-CM

## 2022-10-24 PROBLEM — J32.1 FRONTAL SINUSITIS: Status: RESOLVED | Noted: 2022-04-07 | Resolved: 2022-10-24

## 2022-10-24 PROBLEM — B34.2 CORONAVIRUS INFECTION: Status: RESOLVED | Noted: 2021-10-20 | Resolved: 2022-10-24

## 2022-10-24 PROBLEM — H53.8 BLURRED VISION, BILATERAL: Status: RESOLVED | Noted: 2021-11-03 | Resolved: 2022-10-24

## 2022-10-24 LAB
ALBUMIN SERPL BCP-MCNC: 4.1 G/DL (ref 3.5–5)
ALBUMIN/GLOB SERPL: 1.1 {RATIO}
ALP SERPL-CCNC: 109 U/L (ref 39–100)
ALT SERPL W P-5'-P-CCNC: 28 U/L (ref 13–56)
ANION GAP SERPL CALCULATED.3IONS-SCNC: 15 MMOL/L (ref 7–16)
AST SERPL W P-5'-P-CCNC: 22 U/L (ref 15–37)
BILIRUB SERPL-MCNC: 0.7 MG/DL (ref ?–1.2)
BUN SERPL-MCNC: 15 MG/DL (ref 7–18)
BUN/CREAT SERPL: 12 (ref 6–20)
CALCIUM SERPL-MCNC: 9.1 MG/DL (ref 8.5–10.1)
CHLORIDE SERPL-SCNC: 98 MMOL/L (ref 98–107)
CHOLEST SERPL-MCNC: 187 MG/DL (ref 0–200)
CHOLEST/HDLC SERPL: 4.9 {RATIO}
CO2 SERPL-SCNC: 25 MMOL/L (ref 21–32)
CREAT SERPL-MCNC: 1.21 MG/DL (ref 0.55–1.02)
EGFR (NO RACE VARIABLE) (RUSH/TITUS): 56 ML/MIN/1.73M²
GLOBULIN SER-MCNC: 3.8 G/DL (ref 2–4)
GLUCOSE SERPL-MCNC: 105 MG/DL (ref 74–106)
HDLC SERPL-MCNC: 38 MG/DL (ref 40–60)
LDLC SERPL CALC-MCNC: 115 MG/DL
LDLC/HDLC SERPL: 3 {RATIO}
NONHDLC SERPL-MCNC: 149 MG/DL
POTASSIUM SERPL-SCNC: 3.6 MMOL/L (ref 3.5–5.1)
PROT SERPL-MCNC: 7.9 G/DL (ref 6.4–8.2)
SODIUM SERPL-SCNC: 134 MMOL/L (ref 136–145)
TRIGL SERPL-MCNC: 170 MG/DL (ref 35–150)
VLDLC SERPL-MCNC: 34 MG/DL

## 2022-10-24 PROCEDURE — 99213 PR OFFICE/OUTPT VISIT, EST, LEVL III, 20-29 MIN: ICD-10-PCS | Mod: 25,,, | Performed by: NURSE PRACTITIONER

## 2022-10-24 PROCEDURE — 80053 COMPREHEN METABOLIC PANEL: CPT | Mod: ,,, | Performed by: CLINICAL MEDICAL LABORATORY

## 2022-10-24 PROCEDURE — 99213 OFFICE O/P EST LOW 20 MIN: CPT | Mod: 25,,, | Performed by: NURSE PRACTITIONER

## 2022-10-24 PROCEDURE — 80061 LIPID PANEL: CPT | Mod: ,,, | Performed by: CLINICAL MEDICAL LABORATORY

## 2022-10-24 PROCEDURE — 80061 LIPID PANEL: ICD-10-PCS | Mod: ,,, | Performed by: CLINICAL MEDICAL LABORATORY

## 2022-10-24 PROCEDURE — 80053 COMPREHENSIVE METABOLIC PANEL: ICD-10-PCS | Mod: ,,, | Performed by: CLINICAL MEDICAL LABORATORY

## 2022-10-24 PROCEDURE — 96372 THER/PROPH/DIAG INJ SC/IM: CPT | Mod: ,,, | Performed by: NURSE PRACTITIONER

## 2022-10-24 PROCEDURE — 96372 PR INJECTION,THERAP/PROPH/DIAG2ST, IM OR SUBCUT: ICD-10-PCS | Mod: ,,, | Performed by: NURSE PRACTITIONER

## 2022-10-24 RX ORDER — ONDANSETRON 8 MG/1
8 TABLET, ORALLY DISINTEGRATING ORAL DAILY PRN
COMMUNITY
Start: 2022-06-20

## 2022-10-24 RX ORDER — METHYLPREDNISOLONE ACETATE 40 MG/ML
40 INJECTION, SUSPENSION INTRA-ARTICULAR; INTRALESIONAL; INTRAMUSCULAR; SOFT TISSUE
Status: COMPLETED | OUTPATIENT
Start: 2022-10-24 | End: 2022-10-24

## 2022-10-24 RX ORDER — DEXAMETHASONE SODIUM PHOSPHATE 4 MG/ML
4 INJECTION, SOLUTION INTRA-ARTICULAR; INTRALESIONAL; INTRAMUSCULAR; INTRAVENOUS; SOFT TISSUE
Status: COMPLETED | OUTPATIENT
Start: 2022-10-24 | End: 2022-10-24

## 2022-10-24 RX ORDER — METFORMIN HYDROCHLORIDE 500 MG/1
500 TABLET, EXTENDED RELEASE ORAL DAILY
COMMUNITY
Start: 2022-06-20

## 2022-10-24 RX ORDER — CYANOCOBALAMIN 1000 UG/ML
INJECTION, SOLUTION INTRAMUSCULAR; SUBCUTANEOUS
COMMUNITY
Start: 2022-06-20

## 2022-10-24 RX ORDER — TIZANIDINE 4 MG/1
4 TABLET ORAL NIGHTLY PRN
Qty: 15 TABLET | Refills: 0 | Status: SHIPPED | OUTPATIENT
Start: 2022-10-24 | End: 2022-11-03

## 2022-10-24 RX ORDER — ONDANSETRON 8 MG/1
TABLET, ORALLY DISINTEGRATING ORAL
COMMUNITY
Start: 2022-06-20

## 2022-10-24 RX ORDER — IBUPROFEN 800 MG/1
800 TABLET ORAL 2 TIMES DAILY PRN
Qty: 40 TABLET | Refills: 0 | Status: SHIPPED | OUTPATIENT
Start: 2022-10-24

## 2022-10-24 RX ORDER — PHENTERMINE HYDROCHLORIDE 37.5 MG/1
37.5 TABLET ORAL DAILY
COMMUNITY
Start: 2022-10-08

## 2022-10-24 RX ORDER — FLUOXETINE HYDROCHLORIDE 20 MG/1
20 CAPSULE ORAL DAILY
COMMUNITY
Start: 2022-10-08

## 2022-10-24 RX ADMIN — DEXAMETHASONE SODIUM PHOSPHATE 4 MG: 4 INJECTION, SOLUTION INTRA-ARTICULAR; INTRALESIONAL; INTRAMUSCULAR; INTRAVENOUS; SOFT TISSUE at 04:10

## 2022-10-24 RX ADMIN — METHYLPREDNISOLONE ACETATE 40 MG: 40 INJECTION, SUSPENSION INTRA-ARTICULAR; INTRALESIONAL; INTRAMUSCULAR; SOFT TISSUE at 04:10

## 2022-10-24 NOTE — PROGRESS NOTES
"   SHEY Hogue   [facility]  [address]  [place] [address]     PATIENT NAME: Ana Maria  : 1975  DATE: 10/24/22  MRN: [id number]      Billing Provider: SHEY Hogue  Level of Service: GA OFFICE/OUTPT VISIT, EST, LEVL III, 20-29 MIN  Patient PCP Information       Provider PCP Type    [facility]Y [facility]            Reason for Visit / Chief Complaint: Neck Pain (Feels like a "crick". Pain is going up the left side of head. Started Friday ) and Otalgia (Pain in left ear. Started Friday night at bedtime. )       Update PCP  Update Chief Complaint         History of Present Illness / Problem Focused Workflow     Ana Maria presents to the clinic for pain in left side of neck since Friday. Was worse but got some better but still has one sore spot and is making her ear sore. Denies injury, fever or chills.   Has been sleeping on 2 pillow and denies numbness or tingling in her arms.      Review of Systems     Review of Systems   Constitutional: Negative.  Negative for activity change, appetite change, chills, fatigue, fever and unexpected weight change.   Eyes:  Negative for visual disturbance.   Respiratory:  Negative for cough, chest tightness and shortness of breath.    Cardiovascular:  Negative for chest pain and leg swelling.   Gastrointestinal:  Negative for abdominal pain, blood in stool, change in bowel habit, nausea, vomiting and change in bowel habit.   Endocrine: Negative for cold intolerance, heat intolerance, polydipsia, polyphagia and polyuria.   Genitourinary:  Negative for frequency.   Musculoskeletal:  Positive for neck pain.   Integumentary:  Negative for rash.   Neurological:  Negative for dizziness, weakness and headaches.      Medical / Social / Family History     Past Medical History:   Diagnosis Date    Depression     Migraine        Past Surgical History:   Procedure Laterality Date    HERNIA REPAIR      umbilical hernia repair:  "    HYSTERECTOMY      PRE-MALIGNANT / BENIGN SKIN LESION EXCISION      sebaceous cyst       Social History  Ms.  reports that she has never smoked. She has never been exposed to tobacco smoke. She has never used smokeless tobacco. She reports that she does not drink alcohol and does not use drugs.    Family History  Ms.'s family history includes Hypertension in her father and mother; Migraines in her mother and sister.    Medications and Allergies     Medications  Outpatient Medications Marked as Taking for the 10/24/22 encounter (Office Visit) with SHEY Smyth   Medication Sig Dispense Refill    cyanocobalamin 1,000 mcg/mL injection       ergocalciferol (ERGOCALCIFEROL) 50,000 unit Cap Take 1 capsule by mouth weekly for 12 weeks then take 1 capsule monthly after that 12 capsule 2    FLUoxetine 20 MG capsule Take 20 mg by mouth once daily.      fremanezumab-vfrm (AJOVY AUTOINJECTOR) 225 mg/1.5 mL autoinjector Inject 4.5 mLs (675 mg total) into the skin every 3 (three) months. 3 each 3    lisinopriL 10 MG tablet Take 1.5 tablets (15 mg total) by mouth once daily. 30 tablet 0    metFORMIN (GLUCOPHAGE-XR) 500 MG ER 24hr tablet       ondansetron (ZOFRAN-ODT) 8 MG TbDL Take 8 mg by mouth daily as needed.      phentermine (ADIPEX-P) 37.5 mg tablet Take 37.5 mg by mouth once daily.      rizatriptan (MAXALT-MLT) 10 MG disintegrating tablet TAKE 1 TABLET BY MOUTH at onset OF HEADACHE AS DIRECTED, MAY REPEAT every 2 hours UNTIL RELIEF UP TO a maximum OF 3 tablets in 24 HOURS 9 tablet 5     Current Facility-Administered Medications for the 10/24/22 encounter (Office Visit) with SHEY Smyth   Medication Dose Route Frequency Provider Last Rate Last Admin    acetaminophen tablet 650 mg  650 mg Oral Once PRN Brenda Palomares DNP, FNP        albuterol inhaler 2 puff  2 puff Inhalation Q20 Min PRN Brenda Palomares DNP, FNP        [COMPLETED] dexAMETHasone injection 4 mg  4 mg Intramuscular 1 time in Clinic/HOD  Aurea Bhatt, FNP   4 mg at 10/24/22 1625    [COMPLETED] methylPREDNISolone acetate injection 40 mg  40 mg Intramuscular 1 time in Clinic/HOD Aurea Bhatt FNP   40 mg at 10/24/22 1625       Allergies  Review of patient's allergies indicates:   Allergen Reactions    Azithromycin Itching    Betadine antiseptic gauze Hives       Physical Examination   There were no vitals filed for this visit.   Physical Exam  Constitutional:       General: She is not in acute distress.     Appearance: Normal appearance.   Eyes:      Conjunctiva/sclera: Conjunctivae normal.   Neck:      Thyroid: No thyromegaly.      Vascular: No carotid bruit.   Cardiovascular:      Rate and Rhythm: Normal rate and regular rhythm.      Pulses: Normal pulses.      Heart sounds: Normal heart sounds. No murmur heard.  Pulmonary:      Effort: Pulmonary effort is normal. No accessory muscle usage or respiratory distress.      Breath sounds: Normal breath sounds. No wheezing, rhonchi or rales.   Abdominal:      General: Bowel sounds are normal.      Palpations: Abdomen is soft.      Tenderness: There is no abdominal tenderness.   Musculoskeletal:      Cervical back: Neck supple. Spasms and tenderness present. No edema, deformity or erythema. Pain with movement present. Decreased range of motion.        Back:    Skin:     General: Skin is warm and dry.      Capillary Refill: Capillary refill takes less than 2 seconds.      Coloration: Skin is not jaundiced or pale.   Neurological:      Mental Status: She is alert and oriented to person, place, and time.      Gait: Gait is intact.        Assessment and Plan (including Health Maintenance)      Problem List  Smart Sets  Document Outside HM   :          Health Maintenance Due   Topic Date Due    Lipid Panel  Never done    COVID-19 Vaccine (1) Never done    Mammogram  Never done    Colorectal Cancer Screening  Never done    Influenza Vaccine (1) Never done           Neck muscle spasm  Comments:  Will  treat with depomedrol and decadron injection along with zanaflex to take at bedtime and Motrin to take as directed with food. Continue heat to area.  Orders:  -     methylPREDNISolone acetate injection 40 mg  -     dexAMETHasone injection 4 mg  -     tiZANidine (ZANAFLEX) 4 MG tablet; Take 1 tablet (4 mg total) by mouth nightly as needed (muscle spasm).  Dispense: 15 tablet; Refill: 0  -     ibuprofen (ADVIL,MOTRIN) 800 MG tablet; Take 1 tablet (800 mg total) by mouth 2 (two) times daily as needed for Pain.  Dispense: 40 tablet; Refill: 0    Essential hypertension, benign  Comments:  Will check fasting lipids and CMP. Continue current medication and low sodium diet.  Orders:  -     Lipid Panel; Future; Expected date: 10/24/2022  -     Comprehensive Metabolic Panel; Future; Expected date: 10/24/2022     Health Maintenance Topics with due status: Not Due       Topic Last Completion Date    TETANUS VACCINE 06/28/2022       Procedures            Follow up in about 1 week (around 10/31/2022), or if symptoms worsen or fail to improve.     Signature:  SHEY Hogue    Date of encounter: 10/24/22

## 2022-10-31 RX ORDER — ROSUVASTATIN CALCIUM 5 MG/1
5 TABLET, COATED ORAL DAILY
Qty: 90 TABLET | Refills: 0 | Status: SHIPPED | OUTPATIENT
Start: 2022-10-31 | End: 2023-03-21 | Stop reason: SDUPTHER

## 2023-01-12 DIAGNOSIS — I10 ESSENTIAL HYPERTENSION, BENIGN: ICD-10-CM

## 2023-01-12 RX ORDER — LISINOPRIL 10 MG/1
15 TABLET ORAL DAILY
Qty: 45 TABLET | Refills: 0 | Status: SHIPPED | OUTPATIENT
Start: 2023-01-12 | End: 2023-01-19

## 2023-01-12 NOTE — TELEPHONE ENCOUNTER
Discussed verbally with SHEY Henry and received a VO for the Pt to take Lisinopril 10mg 1.5 tablet by mouth once daily and keep the upcoming appt on 01/19/2023. Notified Pt of new orders, she voiced understanding.

## 2023-01-12 NOTE — TELEPHONE ENCOUNTER
"Pt returned call and medications were discussed. Pt is currently on Lisinopril 10mg 1 tablet by mouth daily. She is no longer on Propanolol which she was taking d/t migraines. Pt states that within the last 2 weeks she has started to "feel funny" and when asked she stated she has had some increased headaches despite taking her migraine prevention meds and has had increased pulse at times and some slight SOB that she has only noticed when laying down. Pt had someone check her BP at work today and it was 150/112. Pt scheduled an appointment for the first available day, 01/19/2023, but asked if she could increase her medication in the mean time. Even if the medication is not increased at this time she needs a refill.   "

## 2023-01-12 NOTE — TELEPHONE ENCOUNTER
Pt has lisinopril and propanolol on her med list but the lisinopril was last written in September 2022 for less than a 30 day supply and propanolol has never been written by this clinic so needs to confirm what the Pt is taking. Left voicemail for return call.

## 2023-01-12 NOTE — TELEPHONE ENCOUNTER
----- Message from Porsche Foley sent at 1/12/2023 11:22 AM CST -----  Pt called stated her BP meds were not refilled @ her last visit. [facility] CB# [phone]

## 2023-01-19 ENCOUNTER — OFFICE VISIT (OUTPATIENT)
Dept: FAMILY MEDICINE | Facility: CLINIC | Age: 48
End: 2023-01-19
Payer: OTHER GOVERNMENT

## 2023-01-19 VITALS
BODY MASS INDEX: 32.85 KG/M2 | HEIGHT: 65 IN | TEMPERATURE: 98 F | HEART RATE: 69 BPM | SYSTOLIC BLOOD PRESSURE: 152 MMHG | DIASTOLIC BLOOD PRESSURE: 86 MMHG | WEIGHT: 197.19 LBS | OXYGEN SATURATION: 99 % | RESPIRATION RATE: 20 BRPM

## 2023-01-19 DIAGNOSIS — I10 ESSENTIAL HYPERTENSION, BENIGN: Primary | ICD-10-CM

## 2023-01-19 DIAGNOSIS — G43.719 INTRACTABLE CHRONIC MIGRAINE WITHOUT AURA AND WITHOUT STATUS MIGRAINOSUS: ICD-10-CM

## 2023-01-19 LAB
EKG 12-LEAD: NORMAL
PR INTERVAL: 167
PRT AXES: NORMAL
QRS DURATION: 76
QT/QTC: NORMAL
VENTRICULAR RATE: 66

## 2023-01-19 PROCEDURE — 99213 OFFICE O/P EST LOW 20 MIN: CPT | Mod: ,,, | Performed by: NURSE PRACTITIONER

## 2023-01-19 PROCEDURE — 93000 ELECTROCARDIOGRAM COMPLETE: CPT | Mod: ,,, | Performed by: NURSE PRACTITIONER

## 2023-01-19 PROCEDURE — 99213 PR OFFICE/OUTPT VISIT, EST, LEVL III, 20-29 MIN: ICD-10-PCS | Mod: ,,, | Performed by: NURSE PRACTITIONER

## 2023-01-19 PROCEDURE — 93000 POCT EKG 12-LEAD: ICD-10-PCS | Mod: ,,, | Performed by: NURSE PRACTITIONER

## 2023-01-19 RX ORDER — DULAGLUTIDE 1.5 MG/.5ML
1.5 INJECTION, SOLUTION SUBCUTANEOUS
COMMUNITY
Start: 2022-06-27

## 2023-01-19 RX ORDER — LISINOPRIL 40 MG/1
40 TABLET ORAL DAILY
Qty: 90 TABLET | Refills: 1 | Status: SHIPPED | OUTPATIENT
Start: 2023-01-19 | End: 2024-02-05

## 2023-01-19 RX ORDER — RIZATRIPTAN BENZOATE 10 MG/1
TABLET, ORALLY DISINTEGRATING ORAL
Qty: 12 TABLET | Refills: 5 | Status: SHIPPED | OUTPATIENT
Start: 2023-01-19 | End: 2023-09-14 | Stop reason: SDUPTHER

## 2023-01-19 NOTE — PROGRESS NOTES
Oliva Echeverria NP   [facility]  [address]  [place]  [address]      PATIENT NAME: Ana Maria  : 1975  DATE: 23  MRN: [id number]      Billing Provider: Oliva Echeverria NP  Level of Service: [place] OFFICE/OUTPT VISIT, EST, LEVL III, 20-29 MIN  Patient PCP Information       Provider PCP Type    Oliva Echeverria NP General            Reason for Visit / Chief Complaint: Hypertension (Bp has been running high and even with the tablet and a half she is still in 150 and 160 on top )       Update PCP  Update Chief Complaint         History of Present Illness / Problem Focused Workflow     Ana Maria presents to the clinic with Hypertension (Bp has been running high and even with the tablet and a half she is still in 150 and 160 on top )     47 year old female presents to clinic with complaints of blood pressure issues. She states she has been taking Lisinopril 10mg one and a half tabs to equal dose of 15mg daily. She has been checking blood pressure at home and at work and it has been running around 150s-160s on top and high 80s on bottom. She complains of frequent headaches as well. Denies blurred vision, chest pain, or dizziness.       Review of Systems     @Review of Systems   Constitutional:  Negative for activity change, appetite change, fatigue and fever.   HENT:  Negative for nasal congestion, ear pain, rhinorrhea, sinus pressure/congestion and sore throat.    Eyes:  Negative for pain, redness, visual disturbance and eye dryness.   Respiratory:  Negative for cough and shortness of breath.    Cardiovascular:  Negative for chest pain and leg swelling.   Gastrointestinal:  Negative for abdominal distention, abdominal pain, constipation and diarrhea.   Endocrine: Negative for cold intolerance, heat intolerance and polyuria.   Genitourinary:  Negative for bladder incontinence, dysuria, frequency and urgency.   Musculoskeletal:  Negative for arthralgias, gait problem and  myalgias.   Integumentary:  Negative for color change, rash and wound.   Allergic/Immunologic: Negative for environmental allergies and food allergies.   Neurological:  Positive for headaches. Negative for dizziness, weakness and light-headedness.   Psychiatric/Behavioral:  Negative for behavioral problems and sleep disturbance.      Medical / Social / Family History     Past Medical History:   Diagnosis Date    Depression     Migraine        Past Surgical History:   Procedure Laterality Date    HERNIA REPAIR      umbilical hernia repair:     HYSTERECTOMY      PRE-MALIGNANT / BENIGN SKIN LESION EXCISION      sebaceous cyst       Social History  Ms.  reports that she has never smoked. She has never been exposed to tobacco smoke. She has never used smokeless tobacco. She reports that she does not drink alcohol and does not use drugs.    Family History  Ms.'s family history includes Hypertension in her father and mother; Migraines in her mother and sister.    Medications and Allergies     Medications  Outpatient Medications Marked as Taking for the 1/19/23 encounter (Office Visit) with Oliva Echeverria NP   Medication Sig Dispense Refill    dulaglutide (TRULICITY) 1.5 mg/0.5 mL pen injector       FLUoxetine 20 MG capsule Take 20 mg by mouth once daily.      fremanezumab-vfrm (AJOVY AUTOINJECTOR) 225 mg/1.5 mL autoinjector Inject 4.5 mLs (675 mg total) into the skin every 3 (three) months. 3 each 3    metFORMIN (GLUCOPHAGE-XR) 500 MG ER 24hr tablet       phentermine (ADIPEX-P) 37.5 mg tablet Take 37.5 mg by mouth once daily.      rosuvastatin (CRESTOR) 5 MG tablet Take 1 tablet (5 mg total) by mouth once daily. 90 tablet 0    [DISCONTINUED] lisinopriL 10 MG tablet Take 1.5 tablets (15 mg total) by mouth once daily. 45 tablet 0    [DISCONTINUED] rizatriptan (MAXALT-MLT) 10 MG disintegrating tablet TAKE 1 TABLET BY MOUTH at onset OF HEADACHE AS DIRECTED, MAY REPEAT every 2 hours UNTIL RELIEF UP TO a maximum OF 3  tablets in 24 HOURS 9 tablet 5       Allergies  Review of patient's allergies indicates:   Allergen Reactions    Azithromycin Itching    Betadine antiseptic gauze Hives       Physical Examination     Vitals:    01/19/23 1418   BP: (!) 152/86   Pulse: 69   Resp: 20   Temp: 97.8 °F (36.6 °C)     Physical Exam  Vitals and nursing note reviewed.   HENT:      Head: Normocephalic.      Right Ear: Tympanic membrane normal.      Left Ear: Tympanic membrane normal.      Nose: Nose normal.      Mouth/Throat:      Mouth: Mucous membranes are moist.      Pharynx: Oropharynx is clear. No posterior oropharyngeal erythema.   Eyes:      Conjunctiva/sclera: Conjunctivae normal.   Cardiovascular:      Rate and Rhythm: Normal rate and regular rhythm.      Pulses: Normal pulses.      Heart sounds: Normal heart sounds.   Pulmonary:      Effort: Pulmonary effort is normal.      Breath sounds: Normal breath sounds.   Abdominal:      General: Abdomen is flat. Bowel sounds are normal. There is no distension.      Palpations: Abdomen is soft.   Musculoskeletal:         General: No swelling or tenderness. Normal range of motion.      Cervical back: Normal range of motion.      Right lower leg: No edema.      Left lower leg: No edema.   Skin:     General: Skin is warm and dry.      Capillary Refill: Capillary refill takes less than 2 seconds.   Neurological:      Mental Status: She is alert. Mental status is at baseline.   Psychiatric:         Mood and Affect: Mood normal.         Behavior: Behavior normal.             Lab Results   Component Value Date    WBC 7.42 11/03/2021    HGB 13.2 11/03/2021    HCT 41.0 11/03/2021    MCV 84.9 11/03/2021     11/03/2021          Sodium   Date Value Ref Range Status   10/24/2022 134 (L) 136 - 145 mmol/L Final     Potassium   Date Value Ref Range Status   10/24/2022 3.6 3.5 - 5.1 mmol/L Final     Chloride   Date Value Ref Range Status   10/24/2022 98 98 - 107 mmol/L Final     CO2   Date Value Ref  Range Status   10/24/2022 25 21 - 32 mmol/L Final     Glucose   Date Value Ref Range Status   10/24/2022 105 74 - 106 mg/dL Final     BUN   Date Value Ref Range Status   10/24/2022 15 7 - 18 mg/dL Final     Creatinine   Date Value Ref Range Status   10/24/2022 1.21 (H) 0.55 - 1.02 mg/dL Final     Calcium   Date Value Ref Range Status   10/24/2022 9.1 8.5 - 10.1 mg/dL Final     Total Protein   Date Value Ref Range Status   10/24/2022 7.9 6.4 - 8.2 g/dL Final     Albumin   Date Value Ref Range Status   10/24/2022 4.1 3.5 - 5.0 g/dL Final     Bilirubin, Total   Date Value Ref Range Status   10/24/2022 0.7 >0.0 - 1.2 mg/dL Final     Alk Phos   Date Value Ref Range Status   10/24/2022 109 (H) 39 - 100 U/L Final     AST   Date Value Ref Range Status   10/24/2022 22 15 - 37 U/L Final     ALT   Date Value Ref Range Status   10/24/2022 28 13 - 56 U/L Final     Anion Gap   Date Value Ref Range Status   10/24/2022 15 7 - 16 mmol/L Final     eGFR    Date Value Ref Range Status   11/24/2020 63       eGFR   Date Value Ref Range Status   11/03/2021 76 >=60 mL/min/1.73m² Final      MRI Brain W WO Contrast  Narrative: EXAMINATION:  MRI BRAIN W WO CONTRAST    CLINICAL HISTORY:  Headache, chronic, new features or increased frequency; Chronic migraine without aura, intractable, without status migrainosus    TECHNIQUE:  Axial sagittal and coronal imaging of the brain is performed without and with intravenous contrast. T1, T2, FLAIR and diffusion weighted sequences are performed. Contrast dose is 18 cc Dotarem.    COMPARISON:  CT 11 August 2021    FINDINGS:  No evidence of restricted diffusion seen.  No evidence of intracranial hemorrhage, mass, mass effect or shift is seen.    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  There is thinning of the parenchyma.  Two foci of white matter T2 signal hyperintensity present 5 mm or less in size present in  the right cerebral hemisphere.  Remaining brain parenchyma has normal signal and differentiation.  The ventricles and cisterns are appropriate in caliber.    The mass effect from dilated ventricles compress the posterior fossa slightly inferior similar to previous CT.  Otherwise the posterior fossa, mid brain and pituitary gland appear within normal limits.  No evidence of cranial or skull base abnormality seen.    No areas of abnormal enhancement are present on the post-contrast images when compared to the pre-contrast study  Impression: Dilated lateral and 3rd ventricles with absence of the septum appears unchanged from CT.  Two foci of white matter T2 signal hyperintensity present in the right cerebellar hemisphere, nonspecific finding can be seen with migraine headaches.    Electronically signed by: Danilo Cole  Date:    12/02/2021  Time:    11:19     Procedures   Assessment and Plan (including Health Maintenance)      Problem List  Smart Sets  Document Outside HM   :    Plan:           Problem List Items Addressed This Visit          Neuro    Intractable chronic migraine without aura and without status migrainosus (Chronic)    Current Assessment & Plan     Will refill Maxalt to take as needed for migraines. Follow up in 3 months or as needed.          Relevant Medications    rizatriptan (MAXALT-MLT) 10 MG disintegrating tablet       Cardiac/Vascular    Essential hypertension, benign - Primary    Current Assessment & Plan     EKG performed in clinic and was normal.   Increase Lisinopril to 40mg daily. Continue to check blood pressure and keep log. Decrease sodium. Follow up in 1 month and bring BP log.   Lifestyle changes to help lower blood pressure reviewed, DASH eating plan encouraged, Weight loss of 0.5-1 lb/week suggested, stressed the importance of routine exercise 30 minutes per day for 3-5 days/week, sodium restriction, and alcohol in moderation less than 2 drinks a day. Patient was encouraged to  check blood pressure daily (at home and work), blood pressure log provided, bring BP log to next appointment, and to monitor for discussed worsening symptoms that require emergent medical attention.             Relevant Medications    lisinopriL (PRINIVIL,ZESTRIL) 40 MG tablet    Other Relevant Orders    POCT EKG 12-LEAD (Manually Resulted by Ordering Provider) (Completed)       Health Maintenance Topics with due status: Not Due       Topic Last Completion Date    TETANUS VACCINE 06/28/2022    Lipid Panel 10/24/2022       No future appointments.     Health Maintenance Due   Topic Date Due    Mammogram  Never done    Hemoglobin A1c (Diabetic Prevention Screening)  Never done    Colorectal Cancer Screening  Never done    Influenza Vaccine (1) Never done        Follow up in about 4 weeks (around 2/16/2023), or if symptoms worsen or fail to improve.     Signature:  Oliva Echeverria NP  [facility]  [address] [place] [address]  [id number]    Date of encounter: 1/19/23

## 2023-02-21 PROBLEM — I10 ESSENTIAL HYPERTENSION, BENIGN: Status: ACTIVE | Noted: 2023-02-21

## 2023-02-22 NOTE — ASSESSMENT & PLAN NOTE
EKG performed in clinic and was normal.   Increase Lisinopril to 40mg daily. Continue to check blood pressure and keep log. Decrease sodium. Follow up in 1 month and bring BP log.   Lifestyle changes to help lower blood pressure reviewed, DASH eating plan encouraged, Weight loss of 0.5-1 lb/week suggested, stressed the importance of routine exercise 30 minutes per day for 3-5 days/week, sodium restriction, and alcohol in moderation less than 2 drinks a day. Patient was encouraged to check blood pressure daily (at home and work), blood pressure log provided, bring BP log to next appointment, and to monitor for discussed worsening symptoms that require emergent medical attention.

## 2023-03-21 ENCOUNTER — OFFICE VISIT (OUTPATIENT)
Dept: FAMILY MEDICINE | Facility: CLINIC | Age: 48
End: 2023-03-21
Payer: OTHER GOVERNMENT

## 2023-03-21 DIAGNOSIS — Z79.899 OTHER LONG TERM (CURRENT) DRUG THERAPY: ICD-10-CM

## 2023-03-21 DIAGNOSIS — I10 ESSENTIAL HYPERTENSION, BENIGN: Primary | ICD-10-CM

## 2023-03-21 DIAGNOSIS — E78.5 ELEVATED LIPIDS: ICD-10-CM

## 2023-03-21 DIAGNOSIS — R09.81 NASAL CONGESTION: ICD-10-CM

## 2023-03-21 DIAGNOSIS — Z13.1 ENCOUNTER FOR SCREENING FOR DIABETES MELLITUS: ICD-10-CM

## 2023-03-21 DIAGNOSIS — Z12.39 ENCOUNTER FOR SCREENING FOR MALIGNANT NEOPLASM OF BREAST, UNSPECIFIED SCREENING MODALITY: ICD-10-CM

## 2023-03-21 PROCEDURE — 99214 OFFICE O/P EST MOD 30 MIN: CPT | Mod: ,,, | Performed by: NURSE PRACTITIONER

## 2023-03-21 PROCEDURE — 99214 PR OFFICE/OUTPT VISIT, EST, LEVL IV, 30-39 MIN: ICD-10-PCS | Mod: ,,, | Performed by: NURSE PRACTITIONER

## 2023-03-21 RX ORDER — TELMISARTAN 20 MG/1
40 TABLET ORAL DAILY
Qty: 180 TABLET | Refills: 1 | Status: CANCELLED | OUTPATIENT
Start: 2023-03-21

## 2023-03-21 RX ORDER — TELMISARTAN 40 MG/1
40 TABLET ORAL DAILY
Qty: 90 TABLET | Refills: 1 | Status: SHIPPED | OUTPATIENT
Start: 2023-03-21 | End: 2023-09-14 | Stop reason: SDUPTHER

## 2023-03-21 RX ORDER — DEXTROMETHORPHAN HYDROBROMIDE AND GUAIFENESIN 10; 200 MG/1; MG/1
1 CAPSULE, GELATIN COATED ORAL EVERY 6 HOURS PRN
Qty: 30 EACH | Refills: 0 | Status: SHIPPED | OUTPATIENT
Start: 2023-03-21 | End: 2023-03-31

## 2023-03-21 RX ORDER — TELMISARTAN 20 MG/1
40 TABLET ORAL DAILY
COMMUNITY
End: 2023-03-21

## 2023-03-21 RX ORDER — CHLORTHALIDONE 25 MG/1
25 TABLET ORAL EVERY MORNING
COMMUNITY
End: 2023-03-21 | Stop reason: SDUPTHER

## 2023-03-21 RX ORDER — ROSUVASTATIN CALCIUM 5 MG/1
5 TABLET, COATED ORAL DAILY
Qty: 90 TABLET | Refills: 1 | Status: SHIPPED | OUTPATIENT
Start: 2023-03-21 | End: 2023-09-14 | Stop reason: SDUPTHER

## 2023-03-21 RX ORDER — CHLORTHALIDONE 25 MG/1
25 TABLET ORAL EVERY MORNING
Qty: 90 TABLET | Refills: 1 | Status: SHIPPED | OUTPATIENT
Start: 2023-03-21 | End: 2023-09-14 | Stop reason: SDUPTHER

## 2023-05-25 VITALS
SYSTOLIC BLOOD PRESSURE: 140 MMHG | HEART RATE: 87 BPM | DIASTOLIC BLOOD PRESSURE: 84 MMHG | WEIGHT: 197.19 LBS | OXYGEN SATURATION: 99 % | HEIGHT: 65 IN | TEMPERATURE: 98 F | RESPIRATION RATE: 16 BRPM | BODY MASS INDEX: 32.85 KG/M2

## 2023-05-25 NOTE — PROGRESS NOTES
Oliva Echeverria NP   [facility]  [address]  [place]  [address]      PATIENT NAME: Ana Maria  : 1975  DATE: 3/21/23  MRN: [id number]      Billing Provider: Oliva Echeverria NP  Level of Service: [place] OFFICE/OUTPT VISIT, EST, LEVL IV, 30-39 MIN  Patient PCP Information       Provider PCP Type    Oliva Echeverria NP General            Reason for Visit / Chief Complaint: Hypertension (Follow-up. Last visit lisinopril was increased. Sees Dr. Chan for weight and blood pressure was still high last visit. He put her on chlorthalidone and micardis and it has kept it controlled. She is needing refills. ), Sinus Problem (Sinus drainage x a couple of days ), and Ear Fullness (Left ear )         History of Present Illness / Problem Focused Workflow     Ana Maria presents to the clinic with Hypertension (Follow-up. Last visit lisinopril was increased. Sees Dr. Chan for weight and blood pressure was still high last visit. He put her on chlorthalidone and micardis and it has kept it controlled. She is needing refills. ), Sinus Problem (Sinus drainage x a couple of days ), and Ear Fullness (Left ear )     47 year old female presents to clinic with complaints of sinus congestion and drainage  and ear fullness x 2 days. Denies fever. She is also here for follow up on her HTN. Her Lisinopril was increased at last visit due to HTN. She states she sees Dr. Chan for weight and her blood pressure was still high at  last visit in his office. He started her on chlorthalidone and micardis and it has kept it controlled.       Review of Systems     @Review of Systems   Constitutional:  Negative for activity change, appetite change, fatigue and fever.   HENT:  Positive for nasal congestion, ear pain and sinus pressure/congestion. Negative for rhinorrhea and sore throat.    Eyes:  Negative for pain, redness, visual disturbance and eye dryness.   Respiratory:  Negative for cough and shortness of  breath.    Cardiovascular:  Negative for chest pain and leg swelling.   Gastrointestinal:  Negative for abdominal distention, abdominal pain, constipation and diarrhea.   Endocrine: Negative for cold intolerance, heat intolerance and polyuria.   Genitourinary:  Negative for bladder incontinence, dysuria, frequency and urgency.   Musculoskeletal:  Negative for arthralgias, gait problem and myalgias.   Integumentary:  Negative for color change, rash and wound.   Allergic/Immunologic: Negative for environmental allergies and food allergies.   Neurological:  Negative for dizziness, weakness, light-headedness and headaches.   Psychiatric/Behavioral:  Negative for behavioral problems and sleep disturbance.      Medical / Social / Family History     Past Medical History:   Diagnosis Date    Depression     Hyperlipidemia     Hypertension     Migraine        Past Surgical History:   Procedure Laterality Date    HERNIA REPAIR  2019    umbilical hernia repair:     HYSTERECTOMY  2018    total    PRE-MALIGNANT / BENIGN SKIN LESION EXCISION      sebaceous cyst    TUBAL LIGATION  2012       Social History  Ms.  reports that she has never smoked. She has never been exposed to tobacco smoke. She has never used smokeless tobacco. She reports that she does not drink alcohol and does not use drugs.    Family History  Ms.'s family history includes COPD in her maternal grandfather; Hypertension in her father and mother; Lung cancer in her paternal grandfather; Migraines in her daughter, maternal grandmother, mother, and sister; No Known Problems in her daughter, daughter, and paternal grandmother.    Medications and Allergies     Medications  Outpatient Medications Marked as Taking for the 3/21/23 encounter (Office Visit) with Oliva Echeverria NP   Medication Sig Dispense Refill    dulaglutide (TRULICITY) 1.5 mg/0.5 mL pen injector Inject 1.5 mg into the skin.      FLUoxetine 20 MG capsule Take 20 mg by mouth once daily.       fremanezumab-vfrm (AJOVY AUTOINJECTOR) 225 mg/1.5 mL autoinjector Inject 4.5 mLs (675 mg total) into the skin every 3 (three) months. 3 each 3    metFORMIN (GLUCOPHAGE-XR) 500 MG ER 24hr tablet Take 500 mg by mouth once daily.      ondansetron (ZOFRAN-ODT) 8 MG TbDL Take 8 mg by mouth daily as needed.      phentermine (ADIPEX-P) 37.5 mg tablet Take 37.5 mg by mouth once daily.      rizatriptan (MAXALT-MLT) 10 MG disintegrating tablet TAKE 1 TABLET BY MOUTH at onset OF HEADACHE AS DIRECTED, MAY REPEAT every 2 hours UNTIL RELIEF UP TO a maximum OF 3 tablets in 24 HOURS 12 tablet 5    [DISCONTINUED] chlorthalidone (HYGROTEN) 25 MG Tab Take 25 mg by mouth every morning. With food      [DISCONTINUED] rosuvastatin (CRESTOR) 5 MG tablet Take 1 tablet (5 mg total) by mouth once daily. 90 tablet 0    [DISCONTINUED] telmisartan (MICARDIS) 20 MG Tab Take 40 mg by mouth once daily.         Allergies  Review of patient's allergies indicates:   Allergen Reactions    Azithromycin Itching    Betadine antiseptic gauze Hives       Physical Examination     Vitals:    03/21/23 1608   BP: (!) 140/84   Pulse:    Resp:    Temp:      Physical Exam  Vitals and nursing note reviewed.   Constitutional:       Appearance: Normal appearance.   HENT:      Head: Normocephalic.      Right Ear: Tympanic membrane normal.      Left Ear: Tympanic membrane normal.      Nose: Congestion and rhinorrhea present. Rhinorrhea is purulent.      Right Turbinates: Pale.      Left Turbinates: Pale.      Right Sinus: Maxillary sinus tenderness and frontal sinus tenderness present.      Left Sinus: Maxillary sinus tenderness and frontal sinus tenderness present.      Mouth/Throat:      Lips: Pink.      Mouth: Mucous membranes are moist.      Pharynx: Oropharyngeal exudate (clear post nasal drainage.) and posterior oropharyngeal erythema present.   Eyes:      Conjunctiva/sclera: Conjunctivae normal.   Cardiovascular:      Rate and Rhythm: Normal rate and regular  rhythm.      Pulses: Normal pulses.      Heart sounds: Normal heart sounds.   Pulmonary:      Effort: Pulmonary effort is normal.      Breath sounds: Normal breath sounds. No wheezing or rhonchi.   Abdominal:      General: Abdomen is flat. Bowel sounds are normal. There is no distension.      Palpations: Abdomen is soft.      Tenderness: There is no abdominal tenderness.   Musculoskeletal:         General: Normal range of motion.      Cervical back: Normal range of motion.   Skin:     General: Skin is warm and dry.      Capillary Refill: Capillary refill takes less than 2 seconds.   Neurological:      General: No focal deficit present.      Mental Status: She is alert and oriented to person, place, and time. Mental status is at baseline.   Psychiatric:         Mood and Affect: Mood normal.         Behavior: Behavior normal.             Lab Results   Component Value Date    WBC 7.42 11/03/2021    HGB 13.2 11/03/2021    HCT 41.0 11/03/2021    MCV 84.9 11/03/2021     11/03/2021        CMP  Sodium   Date Value Ref Range Status   10/24/2022 134 (L) 136 - 145 mmol/L Final     Potassium   Date Value Ref Range Status   10/24/2022 3.6 3.5 - 5.1 mmol/L Final     Chloride   Date Value Ref Range Status   10/24/2022 98 98 - 107 mmol/L Final     CO2   Date Value Ref Range Status   10/24/2022 25 21 - 32 mmol/L Final     Glucose   Date Value Ref Range Status   10/24/2022 105 74 - 106 mg/dL Final     BUN   Date Value Ref Range Status   10/24/2022 15 7 - 18 mg/dL Final     Creatinine   Date Value Ref Range Status   10/24/2022 1.21 (H) 0.55 - 1.02 mg/dL Final     Calcium   Date Value Ref Range Status   10/24/2022 9.1 8.5 - 10.1 mg/dL Final     Total Protein   Date Value Ref Range Status   10/24/2022 7.9 6.4 - 8.2 g/dL Final     Albumin   Date Value Ref Range Status   10/24/2022 4.1 3.5 - 5.0 g/dL Final     Bilirubin, Total   Date Value Ref Range Status   10/24/2022 0.7 >0.0 - 1.2 mg/dL Final     Alk Phos   Date Value Ref Range  Status   10/24/2022 109 (H) 39 - 100 U/L Final     AST   Date Value Ref Range Status   10/24/2022 22 15 - 37 U/L Final     ALT   Date Value Ref Range Status   10/24/2022 28 13 - 56 U/L Final     Anion Gap   Date Value Ref Range Status   10/24/2022 15 7 - 16 mmol/L Final     eGFR   Date Value Ref Range Status   10/24/2022 56 (L) >=60 mL/min/1.73m² Final     Procedures   Assessment and Plan (including Health Maintenance)   :    Plan:           Problem List Items Addressed This Visit          Cardiac/Vascular    Essential hypertension, benign - Primary    Relevant Medications    chlorthalidone (HYGROTEN) 25 MG Tab    telmisartan (MICARDIS) 40 MG Tab    Other Relevant Orders    CBC Auto Differential    Comprehensive Metabolic Panel     Other Visit Diagnoses       Elevated lipids        Relevant Medications    rosuvastatin (CRESTOR) 5 MG tablet    Other Relevant Orders    Lipid Panel    Encounter for screening for diabetes mellitus        Relevant Orders    Hemoglobin A1C    Other long term (current) drug therapy        Relevant Orders    CK    Encounter for screening for malignant neoplasm of breast, unspecified screening modality        Relevant Orders    Mammo Digital Screening Bilat    Nasal congestion                Health Maintenance Topics with due status: Not Due       Topic Last Completion Date    TETANUS VACCINE 06/28/2022    Lipid Panel 10/24/2022    Influenza Vaccine Not Due       No future appointments.     Health Maintenance Due   Topic Date Due    Mammogram  Never done    Hemoglobin A1c (Diabetic Prevention Screening)  Never done    Colorectal Cancer Screening  Never done        No follow-ups on file.     Signature:  Oliva Echeverria NP  [facility]  [address] [address] [place]  [address]    Date of encounter: 3/21/23

## 2023-09-14 ENCOUNTER — OFFICE VISIT (OUTPATIENT)
Dept: FAMILY MEDICINE | Facility: CLINIC | Age: 48
End: 2023-09-14
Payer: OTHER GOVERNMENT

## 2023-09-14 VITALS
DIASTOLIC BLOOD PRESSURE: 88 MMHG | OXYGEN SATURATION: 99 % | HEIGHT: 65 IN | SYSTOLIC BLOOD PRESSURE: 120 MMHG | WEIGHT: 203 LBS | BODY MASS INDEX: 33.82 KG/M2 | HEART RATE: 81 BPM | RESPIRATION RATE: 18 BRPM | TEMPERATURE: 98 F

## 2023-09-14 DIAGNOSIS — E78.5 ELEVATED LIPIDS: ICD-10-CM

## 2023-09-14 DIAGNOSIS — J01.00 ACUTE NON-RECURRENT MAXILLARY SINUSITIS: Primary | ICD-10-CM

## 2023-09-14 DIAGNOSIS — G43.719 INTRACTABLE CHRONIC MIGRAINE WITHOUT AURA AND WITHOUT STATUS MIGRAINOSUS: ICD-10-CM

## 2023-09-14 DIAGNOSIS — I10 ESSENTIAL HYPERTENSION, BENIGN: ICD-10-CM

## 2023-09-14 PROCEDURE — 96372 PR INJECTION,THERAP/PROPH/DIAG2ST, IM OR SUBCUT: ICD-10-PCS | Mod: ,,,

## 2023-09-14 PROCEDURE — 96372 THER/PROPH/DIAG INJ SC/IM: CPT | Mod: ,,,

## 2023-09-14 PROCEDURE — 99214 PR OFFICE/OUTPT VISIT, EST, LEVL IV, 30-39 MIN: ICD-10-PCS | Mod: 25,,,

## 2023-09-14 PROCEDURE — 99214 OFFICE O/P EST MOD 30 MIN: CPT | Mod: 25,,,

## 2023-09-14 RX ORDER — CEFTRIAXONE 1 G/1
1 INJECTION, POWDER, FOR SOLUTION INTRAMUSCULAR; INTRAVENOUS
Status: COMPLETED | OUTPATIENT
Start: 2023-09-14 | End: 2023-09-14

## 2023-09-14 RX ORDER — DEXAMETHASONE SODIUM PHOSPHATE 4 MG/ML
4 INJECTION, SOLUTION INTRA-ARTICULAR; INTRALESIONAL; INTRAMUSCULAR; INTRAVENOUS; SOFT TISSUE
Status: COMPLETED | OUTPATIENT
Start: 2023-09-14 | End: 2023-09-14

## 2023-09-14 RX ORDER — KETOROLAC TROMETHAMINE 30 MG/ML
60 INJECTION, SOLUTION INTRAMUSCULAR; INTRAVENOUS
Status: COMPLETED | OUTPATIENT
Start: 2023-09-14 | End: 2023-09-14

## 2023-09-14 RX ORDER — RIZATRIPTAN BENZOATE 10 MG/1
TABLET, ORALLY DISINTEGRATING ORAL
Qty: 12 TABLET | Refills: 5 | Status: SHIPPED | OUTPATIENT
Start: 2023-09-14

## 2023-09-14 RX ORDER — AMOXICILLIN AND CLAVULANATE POTASSIUM 875; 125 MG/1; MG/1
1 TABLET, FILM COATED ORAL EVERY 12 HOURS
Qty: 14 TABLET | Refills: 0 | Status: SHIPPED | OUTPATIENT
Start: 2023-09-14 | End: 2023-09-21

## 2023-09-14 RX ORDER — ROSUVASTATIN CALCIUM 5 MG/1
5 TABLET, COATED ORAL DAILY
Qty: 90 TABLET | Refills: 1 | Status: SHIPPED | OUTPATIENT
Start: 2023-09-14 | End: 2024-03-12

## 2023-09-14 RX ORDER — CHLORTHALIDONE 25 MG/1
25 TABLET ORAL EVERY MORNING
Qty: 90 TABLET | Refills: 1 | Status: SHIPPED | OUTPATIENT
Start: 2023-09-14

## 2023-09-14 RX ORDER — TELMISARTAN 40 MG/1
40 TABLET ORAL DAILY
Qty: 90 TABLET | Refills: 1 | Status: SHIPPED | OUTPATIENT
Start: 2023-09-14 | End: 2024-09-13

## 2023-09-14 RX ADMIN — KETOROLAC TROMETHAMINE 60 MG: 30 INJECTION, SOLUTION INTRAMUSCULAR; INTRAVENOUS at 04:09

## 2023-09-14 RX ADMIN — DEXAMETHASONE SODIUM PHOSPHATE 4 MG: 4 INJECTION, SOLUTION INTRA-ARTICULAR; INTRALESIONAL; INTRAMUSCULAR; INTRAVENOUS; SOFT TISSUE at 04:09

## 2023-09-14 RX ADMIN — CEFTRIAXONE 1 G: 1 INJECTION, POWDER, FOR SOLUTION INTRAMUSCULAR; INTRAVENOUS at 04:09

## 2023-09-14 NOTE — PROGRESS NOTES
SHEY ELIZABETH   [place] [address] [facility] [address]  [place]  [address]      PATIENT NAME: Ana Maria  : 1975  DATE: 23  MRN: [id number]      Billing Provider: SHEY ELIZABETH  Level of Service: HI OFFICE/OUTPT VISIT, EST, LEVL IV, 30-39 MIN  Patient PCP Information       Provider PCP Type    Oliva Echeverria NP General            Reason for Visit / Chief Complaint: Nasal Congestion (Patient has nasal congestion that began yesterday. ), Ear Fullness (Patients left ear started to feel full after attempting to blow her nose yesterday.), and Headache (Patient presents today with a headache. She suffers from migraines and has taken her medication but it only stops and returns. She states this is usually what happens when she has a sinus infection. She does not want to be tested for covid ,flu or strep. Onset two days ago.)         History of Present Illness / Problem Focused Workflow     Ana Maria presents to the clinic with Nasal Congestion (Patient has nasal congestion that began yesterday. ), Ear Fullness (Patients left ear started to feel full after attempting to blow her nose yesterday.), and Headache (Patient presents today with a headache. She suffers from migraines and has taken her medication but it only stops and returns. She states this is usually what happens when she has a sinus infection. She does not want to be tested for covid ,flu or strep. Onset two days ago.)     47 y/o female presents to clinic with complaints of Nasal Congestion (Patient has nasal congestion that began yesterday. ), Ear Fullness (Patients left ear started to feel full after attempting to blow her nose yesterday.), and Headache (Patient presents today with a headache. She suffers from migraines and has taken her medication but it only stops and returns. She states this is usually what happens when she has a sinus infection. She does not want to be tested for covid ,flu or strep. Onset  two days ago.)    Review of Systems     @Review of Systems   Constitutional:  Negative for chills, fatigue and fever.   HENT:  Positive for nasal congestion and sinus pressure/congestion. Negative for ear pain and sore throat.    Respiratory:  Negative for cough, chest tightness, shortness of breath and wheezing.    Cardiovascular:  Negative for chest pain and palpitations.   Gastrointestinal:  Negative for abdominal pain, nausea and vomiting.   Musculoskeletal:  Negative for myalgias.   Neurological:  Negative for dizziness, weakness, light-headedness and headaches.   Psychiatric/Behavioral:  Negative for suicidal ideas.        Medical / Social / Family History     Past Medical History:   Diagnosis Date    Depression     Hyperlipidemia     Hypertension     Migraine        Past Surgical History:   Procedure Laterality Date    HERNIA REPAIR  2019    umbilical hernia repair:     HYSTERECTOMY  2018    total    PRE-MALIGNANT / BENIGN SKIN LESION EXCISION      sebaceous cyst    TUBAL LIGATION  2012       Social History  Ms.  reports that she has never smoked. She has never been exposed to tobacco smoke. She has never used smokeless tobacco. She reports that she does not drink alcohol and does not use drugs.    Family History  Ms.'s family history includes COPD in her maternal grandfather; Hypertension in her father and mother; Lung cancer in her paternal grandfather; Migraines in her daughter, maternal grandmother, mother, and sister; No Known Problems in her daughter, daughter, and paternal grandmother.    Medications and Allergies     Medications  Outpatient Medications Marked as Taking for the 9/14/23 encounter (Office Visit) with Raúl Rothman FNP   Medication Sig Dispense Refill    ALAHIST PE 2-7.5 mg Tab Take 1 tablet by mouth every 4 to 6 hours as needed.      EScitalopram oxalate (LEXAPRO) 20 MG tablet Take 20 mg by mouth once daily.      metFORMIN (GLUCOPHAGE-XR) 500 MG ER 24hr tablet Take 500 mg by  mouth once daily.      ondansetron (ZOFRAN-ODT) 8 MG TbDL Take 8 mg by mouth daily as needed.      ondansetron (ZOFRAN-ODT) 8 MG TbDL       phentermine (ADIPEX-P) 37.5 mg tablet Take 37.5 mg by mouth once daily.      [DISCONTINUED] chlorthalidone (HYGROTEN) 25 MG Tab Take 1 tablet (25 mg total) by mouth every morning. With food 90 tablet 1    [DISCONTINUED] rizatriptan (MAXALT-MLT) 10 MG disintegrating tablet TAKE 1 TABLET BY MOUTH at onset OF HEADACHE AS DIRECTED, MAY REPEAT every 2 hours UNTIL RELIEF UP TO a maximum OF 3 tablets in 24 HOURS 12 tablet 5    [DISCONTINUED] rosuvastatin (CRESTOR) 5 MG tablet Take 1 tablet (5 mg total) by mouth once daily. 90 tablet 1    [DISCONTINUED] telmisartan (MICARDIS) 40 MG Tab Take 1 tablet (40 mg total) by mouth once daily. 90 tablet 1       Allergies  Review of patient's allergies indicates:   Allergen Reactions    Azithromycin Itching    Betadine antiseptic gauze Hives       Physical Examination     Vitals:    09/14/23 1544   BP: 120/88   Pulse: 81   Resp: 18   Temp: 98.3 °F (36.8 °C)     Physical Exam  Vitals and nursing note reviewed.   Constitutional:       General: She is awake.      Appearance: Normal appearance. She is overweight.   HENT:      Head: Normocephalic.      Right Ear: Tympanic membrane and ear canal normal.      Left Ear: Tympanic membrane and ear canal normal.      Nose: Rhinorrhea present.      Right Sinus: Maxillary sinus tenderness present.      Left Sinus: Maxillary sinus tenderness present.      Mouth/Throat:      Lips: Pink.      Mouth: Mucous membranes are moist.      Pharynx: Oropharynx is clear. Uvula midline.   Eyes:      Conjunctiva/sclera: Conjunctivae normal.      Pupils: Pupils are equal, round, and reactive to light.   Cardiovascular:      Rate and Rhythm: Normal rate and regular rhythm.      Heart sounds: Normal heart sounds, S1 normal and S2 normal.   Pulmonary:      Effort: No respiratory distress.      Breath sounds: Normal breath  sounds. No decreased breath sounds, wheezing, rhonchi or rales.   Abdominal:      General: Bowel sounds are normal.      Palpations: Abdomen is soft.      Tenderness: There is no abdominal tenderness.   Musculoskeletal:      Cervical back: Normal range of motion.   Lymphadenopathy:      Cervical: No cervical adenopathy.   Skin:     General: Skin is warm.      Capillary Refill: Capillary refill takes less than 2 seconds.   Neurological:      Mental Status: She is alert and oriented to person, place, and time.   Psychiatric:         Attention and Perception: Attention normal.         Mood and Affect: Mood normal.         Speech: Speech normal.         Behavior: Behavior normal. Behavior is cooperative.         Thought Content: Thought content does not include homicidal or suicidal ideation. Thought content does not include homicidal or suicidal plan.               Lab Results   Component Value Date    WBC 7.42 11/03/2021    HGB 13.2 11/03/2021    HCT 41.0 11/03/2021    MCV 84.9 11/03/2021     11/03/2021        CMP  Sodium   Date Value Ref Range Status   10/24/2022 134 (L) 136 - 145 mmol/L Final     Potassium   Date Value Ref Range Status   10/24/2022 3.6 3.5 - 5.1 mmol/L Final     Chloride   Date Value Ref Range Status   10/24/2022 98 98 - 107 mmol/L Final     CO2   Date Value Ref Range Status   10/24/2022 25 21 - 32 mmol/L Final     Glucose   Date Value Ref Range Status   10/24/2022 105 74 - 106 mg/dL Final     BUN   Date Value Ref Range Status   10/24/2022 15 7 - 18 mg/dL Final     Creatinine   Date Value Ref Range Status   10/24/2022 1.21 (H) 0.55 - 1.02 mg/dL Final     Calcium   Date Value Ref Range Status   10/24/2022 9.1 8.5 - 10.1 mg/dL Final     Total Protein   Date Value Ref Range Status   10/24/2022 7.9 6.4 - 8.2 g/dL Final     Albumin   Date Value Ref Range Status   10/24/2022 4.1 3.5 - 5.0 g/dL Final     Bilirubin, Total   Date Value Ref Range Status   10/24/2022 0.7 >0.0 - 1.2 mg/dL Final     Alk  Phos   Date Value Ref Range Status   10/24/2022 109 (H) 39 - 100 U/L Final     AST   Date Value Ref Range Status   10/24/2022 22 15 - 37 U/L Final     ALT   Date Value Ref Range Status   10/24/2022 28 13 - 56 U/L Final     Anion Gap   Date Value Ref Range Status   10/24/2022 15 7 - 16 mmol/L Final     eGFR   Date Value Ref Range Status   10/24/2022 56 (L) >=60 mL/min/1.73m² Final     Procedures   Assessment and Plan (including Health Maintenance)   :    Plan:           Problem List Items Addressed This Visit          Neuro    Intractable chronic migraine without aura and without status migrainosus (Chronic)    Current Assessment & Plan     Symptoms controlled well with Maxalt prn. Continue current regimen. Medication refilled today         Relevant Medications    rizatriptan (MAXALT-MLT) 10 MG disintegrating tablet       ENT    Acute non-recurrent maxillary sinusitis - Primary    Current Assessment & Plan     Rocephin IM and Decadron IM today. Augmentin RX. Medication instructions and education given with understanding voiced. Rest, increase fluids. OTC antihistamines, decongestants, Ibuprofen, Tylenol as needed. RTC with worsening, new or persistent symptoms.             Cardiac/Vascular    Essential hypertension, benign    Current Assessment & Plan     BP controlled. Continue current medication regimen. Monitor BP daily and keep BP daily log to bring to next visit. Exercise at least 5 times a week. Keep scheduled appts. RTC sooner if BP is staying <130/80. Dash diet.    DASH- includes foods rich in K+, calcium and Magnesium.The diet limits foods high in sodium, saturated fats and added sugars. This is a flexible balanced eating plan that helps create heart healthy eating style for life. Diet is rich in vegetable, whole grains and fruits. It includes fat free or low fat dairy products, fish, poultry, nuts. Chose foods high in K+, calcium, magnesium, fiber, protein, and low in saturated fats and sodium.          Relevant Medications    chlorthalidone (HYGROTEN) 25 MG Tab    telmisartan (MICARDIS) 40 MG Tab    Elevated lipids    Current Assessment & Plan     Lab work today and will call pt with results and any new orders         Relevant Medications    rosuvastatin (CRESTOR) 5 MG tablet       Health Maintenance Topics with due status: Not Due       Topic Last Completion Date    TETANUS VACCINE 06/28/2022    Lipid Panel 10/24/2022       No future appointments.     Health Maintenance Due   Topic Date Due    Hepatitis C Screening  Never done    HIV Screening  Never done    Mammogram  Never done    Hemoglobin A1c (Diabetic Prevention Screening)  Never done    Colorectal Cancer Screening  Never done    Influenza Vaccine (1) Never done        No follow-ups on file.     Signature:  SHEY ELIZABETH  [facility]  [address] [address] [place]  [address]    Date of encounter: 9/14/23

## 2023-11-08 ENCOUNTER — HOSPITAL ENCOUNTER (OUTPATIENT)
Dept: RADIOLOGY | Facility: HOSPITAL | Age: 48
Discharge: HOME | End: 2023-11-08
Payer: OTHER GOVERNMENT

## 2023-11-08 DIAGNOSIS — M79.601 RIGHT ARM PAIN: ICD-10-CM
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
## 2023-11-08 DIAGNOSIS — M25.511 ACUTE PAIN OF RIGHT SHOULDER: ICD-10-CM

## 2023-11-08 DIAGNOSIS — M25.511 ACUTE PAIN OF RIGHT SHOULDER: Primary | ICD-10-CM

## 2023-11-08 PROCEDURE — 73090 X-RAY EXAM OF FOREARM: CPT | Mod: TC,RT

## 2023-11-08 PROCEDURE — 73030 X-RAY EXAM OF SHOULDER: CPT | Mod: TC,RT

## 2023-11-08 PROCEDURE — 73070 X-RAY EXAM OF ELBOW: CPT | Mod: TC,RT

## 2023-11-08 NOTE — PROGRESS NOTES
Patient fell at work today and landed on right side. She is now complaining of right shoulder, right arm pain. Will orders Xrays and call pt with results and any new orders.

## 2023-11-10 ENCOUNTER — ATHLETIC TRAINING SESSION (OUTPATIENT)
Dept: SPORTS MEDICINE | Facility: CLINIC | Age: 48
End: 2023-11-10
Payer: OTHER GOVERNMENT

## 2023-11-10 NOTE — PROGRESS NOTES
Subjective:       Chief Complaint: Ana Maria is a 48 y.o. female student at  who complains of right arm pain after falling at her house                      Objective:       General: Aan is well-developed, well-nourished, appears stated age, in no acute distress, alert and oriented to time, place and person.     AT Session  Patient has moderate pain over upper right arm        Assessment:     Status:     Date Seen: 11-08-23    Date of Injury: 11-08-23    Date Out:     Date Cleared:       Plan:       1. Patient was referred to [facility] ED for xray and further eval  2. Physician Referral: no  3. ED Referral: yes  4. Parent/Guardian Notified:   5. All questions were answered, ath. will contact me for questions or concerns in  the interim.  6.         Eligible to use School Insurance: No, not a school related injury

## 2023-12-05 PROBLEM — E78.5 ELEVATED LIPIDS: Status: ACTIVE | Noted: 2023-12-05

## 2023-12-05 PROBLEM — J01.00 ACUTE NON-RECURRENT MAXILLARY SINUSITIS: Status: ACTIVE | Noted: 2023-12-05

## 2023-12-06 NOTE — ASSESSMENT & PLAN NOTE
Rocephin IM and Decadron IM today. Augmentin RX. Medication instructions and education given with understanding voiced. Rest, increase fluids. OTC antihistamines, decongestants, Ibuprofen, Tylenol as needed. RTC with worsening, new or persistent symptoms.

## 2023-12-06 NOTE — ASSESSMENT & PLAN NOTE
BP controlled. Continue current medication regimen. Monitor BP daily and keep BP daily log to bring to next visit. Exercise at least 5 times a week. Keep scheduled appts. RTC sooner if BP is staying <130/80. Dash diet.    DASH- includes foods rich in K+, calcium and Magnesium.The diet limits foods high in sodium, saturated fats and added sugars. This is a flexible balanced eating plan that helps create heart healthy eating style for life. Diet is rich in vegetable, whole grains and fruits. It includes fat free or low fat dairy products, fish, poultry, nuts. Chose foods high in K+, calcium, magnesium, fiber, protein, and low in saturated fats and sodium.

## 2024-02-05 ENCOUNTER — OFFICE VISIT (OUTPATIENT)
Dept: FAMILY MEDICINE | Facility: CLINIC | Age: 49
End: 2024-02-05
Payer: OTHER GOVERNMENT

## 2024-02-05 VITALS
DIASTOLIC BLOOD PRESSURE: 80 MMHG | SYSTOLIC BLOOD PRESSURE: 122 MMHG | BODY MASS INDEX: 34.82 KG/M2 | HEART RATE: 75 BPM | OXYGEN SATURATION: 98 % | RESPIRATION RATE: 18 BRPM | WEIGHT: 209 LBS | TEMPERATURE: 98 F | HEIGHT: 65 IN

## 2024-02-05 DIAGNOSIS — U07.1 COVID: Primary | ICD-10-CM

## 2024-02-05 DIAGNOSIS — J32.4 PANSINUSITIS, UNSPECIFIED CHRONICITY: ICD-10-CM

## 2024-02-05 DIAGNOSIS — R09.81 NASAL CONGESTION: ICD-10-CM

## 2024-02-05 PROCEDURE — 99213 OFFICE O/P EST LOW 20 MIN: CPT | Mod: ,,, | Performed by: NURSE PRACTITIONER

## 2024-02-05 PROCEDURE — 87428 SARSCOV & INF VIR A&B AG IA: CPT | Mod: QW,,, | Performed by: NURSE PRACTITIONER

## 2024-02-05 RX ORDER — METHYLPREDNISOLONE 4 MG/1
TABLET ORAL
Qty: 21 EACH | Refills: 0 | Status: SHIPPED | OUTPATIENT
Start: 2024-02-05 | End: 2024-02-26

## 2024-02-05 RX ORDER — GUAIFENESIN AND DEXTROMETHORPHAN HYDROBROMIDE 1200; 60 MG/1; MG/1
1 TABLET, EXTENDED RELEASE ORAL 2 TIMES DAILY
Qty: 20 TABLET | Refills: 0 | Status: SHIPPED | OUTPATIENT
Start: 2024-02-05 | End: 2024-02-15

## 2024-02-05 RX ORDER — AMOXICILLIN AND CLAVULANATE POTASSIUM 875; 125 MG/1; MG/1
1 TABLET, FILM COATED ORAL EVERY 12 HOURS
Qty: 14 TABLET | Refills: 0 | Status: SHIPPED | OUTPATIENT
Start: 2024-02-05 | End: 2024-04-18

## 2024-02-05 NOTE — LETTER
February 5, 2024      [facility] [address] [place] [address]  Phone: [phone]  Fax: [phone]       Patient: Ana Maria   YOB: 1975  Date of Visit: 02/05/2024    To Whom It May Concern:    Norma Maria  was at [facility] on 02/05/2024. The patient may return to work/school on 02/09/2024  with no restrictions. If you have any questions or concerns, or if I can be of further assistance, please do not hesitate to contact me.    Sincerely,        VIOLETA Hernandez

## 2024-02-05 NOTE — PROGRESS NOTES
Health Maintenance Due   Topic Date Due    Hepatitis C Screening  Never done    COVID-19 Vaccine (1) Never done    HIV Screening  Never done    Mammogram  Never done    Hemoglobin A1c (Diabetic Prevention Screening)  Never done    Colorectal Cancer Screening  Never done    Influenza Vaccine (1) Never done     Discussed care gaps.  Not interested in vaccs/screenings/mammo.

## 2024-02-07 PROBLEM — U07.1 COVID: Status: ACTIVE | Noted: 2021-10-20

## 2024-02-07 PROBLEM — J32.4 PANSINUSITIS: Status: ACTIVE | Noted: 2024-02-07

## 2024-02-07 PROBLEM — J01.00 ACUTE NON-RECURRENT MAXILLARY SINUSITIS: Status: RESOLVED | Noted: 2023-12-05 | Resolved: 2024-02-07

## 2024-02-07 NOTE — ASSESSMENT & PLAN NOTE
Head congestion, sinus pressure, cough  Positive for covid  Start OTC cold meds that are safe for HTN  Rest. Increase fluids as tolerated  Isolate from others

## 2024-04-18 ENCOUNTER — OFFICE VISIT (OUTPATIENT)
Dept: FAMILY MEDICINE | Facility: CLINIC | Age: 49
End: 2024-04-18
Payer: OTHER GOVERNMENT

## 2024-04-18 VITALS
RESPIRATION RATE: 20 BRPM | OXYGEN SATURATION: 99 % | HEIGHT: 65 IN | SYSTOLIC BLOOD PRESSURE: 128 MMHG | HEART RATE: 70 BPM | BODY MASS INDEX: 35.99 KG/M2 | DIASTOLIC BLOOD PRESSURE: 72 MMHG | WEIGHT: 216 LBS | TEMPERATURE: 98 F

## 2024-04-18 DIAGNOSIS — J02.9 SORE THROAT: ICD-10-CM

## 2024-04-18 DIAGNOSIS — R09.81 NASAL CONGESTION: ICD-10-CM

## 2024-04-18 DIAGNOSIS — J01.00 ACUTE MAXILLARY SINUSITIS, RECURRENCE NOT SPECIFIED: Primary | ICD-10-CM

## 2024-04-18 LAB
CTP QC/QA: YES
CTP QC/QA: YES
MOLECULAR STREP A: NEGATIVE
POC MOLECULAR INFLUENZA A AGN: NEGATIVE
POC MOLECULAR INFLUENZA B AGN: NEGATIVE

## 2024-04-18 PROCEDURE — 96372 THER/PROPH/DIAG INJ SC/IM: CPT | Mod: ,,,

## 2024-04-18 PROCEDURE — 87651 STREP A DNA AMP PROBE: CPT | Mod: QW,,,

## 2024-04-18 PROCEDURE — 87502 INFLUENZA DNA AMP PROBE: CPT | Mod: QW,,,

## 2024-04-18 PROCEDURE — 99213 OFFICE O/P EST LOW 20 MIN: CPT | Mod: 25,,,

## 2024-04-18 RX ORDER — CEFTRIAXONE 1 G/1
1 INJECTION, POWDER, FOR SOLUTION INTRAMUSCULAR; INTRAVENOUS
Status: COMPLETED | OUTPATIENT
Start: 2024-04-18 | End: 2024-04-18

## 2024-04-18 RX ORDER — CEFDINIR 300 MG/1
300 CAPSULE ORAL 2 TIMES DAILY
Qty: 20 CAPSULE | Refills: 0 | Status: SHIPPED | OUTPATIENT
Start: 2024-04-18 | End: 2024-04-28

## 2024-04-18 RX ORDER — DEXAMETHASONE SODIUM PHOSPHATE 4 MG/ML
4 INJECTION, SOLUTION INTRA-ARTICULAR; INTRALESIONAL; INTRAMUSCULAR; INTRAVENOUS; SOFT TISSUE
Status: COMPLETED | OUTPATIENT
Start: 2024-04-18 | End: 2024-04-18

## 2024-04-18 RX ADMIN — CEFTRIAXONE 1 G: 1 INJECTION, POWDER, FOR SOLUTION INTRAMUSCULAR; INTRAVENOUS at 05:04

## 2024-04-18 RX ADMIN — DEXAMETHASONE SODIUM PHOSPHATE 4 MG: 4 INJECTION, SOLUTION INTRA-ARTICULAR; INTRALESIONAL; INTRAMUSCULAR; INTRAVENOUS; SOFT TISSUE at 05:04

## 2024-04-18 NOTE — PROGRESS NOTES
DANITZA PARKER, SHEY   [facility]  [address]  [place]  [address]      PATIENT NAME: Ana Maria  : 1975  DATE: 24  MRN: [id number]        Reason for Visit / Chief Complaint: Sore Throat (Patient is a 48 year old female who presents to the clinic with a sore throat x 3 days, periodically.  ), Headache (Patient reports headache since Saturday.  ), Ear Fullness (Patient reports left ear fullness x 3 days. ), and Nasal Congestion (Patient reports nasal congestion x 3 day, using flonase with mild relief. Patient has also taken 3 days of Amoxicillin /)         History of Present Illness / Problem Focused Workflow     49 y/o female presents to clinic with complaints of Ssore throat x 3 days, periodically.  ), Headache (Patient reports headache since Saturday.  ), Ear Fullness (Patient reports left ear fullness x 3 days. ), and Nasal Congestion (Patient reports nasal congestion x 3 day, using flonase with mild relief.     Review of Systems     @Review of Systems   Constitutional:  Negative for chills, fatigue and fever.   HENT:  Positive for nasal congestion and sore throat. Negative for ear discharge, ear pain, rhinorrhea and sinus pressure/congestion.    Respiratory:  Negative for cough, chest tightness, shortness of breath, wheezing and stridor.    Cardiovascular:  Negative for palpitations and claudication.   Gastrointestinal:  Negative for abdominal pain, constipation, diarrhea, nausea, vomiting and reflux.   Genitourinary:  Negative for dysuria, flank pain, frequency, hematuria and urgency.   Musculoskeletal:  Negative for myalgias.   Neurological:  Positive for headaches. Negative for dizziness, weakness and light-headedness.   Psychiatric/Behavioral:  Negative for suicidal ideas.        Medical / Social / Family History     Past Medical History:   Diagnosis Date    Depression     Hyperlipidemia     Hypertension     Migraine        Past Surgical History:   Procedure Laterality  Date    HERNIA REPAIR  2019    umbilical hernia repair:     HYSTERECTOMY  2018    total    PRE-MALIGNANT / BENIGN SKIN LESION EXCISION      sebaceous cyst    TUBAL LIGATION  2012           Medications and Allergies     Medications  Outpatient Medications Marked as Taking for the 4/18/24 encounter (Office Visit) with Raúl Rothman FNP   Medication Sig Dispense Refill    FLUoxetine 20 MG capsule Take 20 mg by mouth once daily.      ibuprofen (ADVIL,MOTRIN) 800 MG tablet Take 1 tablet (800 mg total) by mouth 2 (two) times daily as needed for Pain. 40 tablet 0    metFORMIN (GLUCOPHAGE-XR) 500 MG ER 24hr tablet Take 500 mg by mouth once daily. (Patient not taking: Reported on 5/9/2024)      ondansetron (ZOFRAN-ODT) 8 MG TbDL Take 8 mg by mouth daily as needed.      rosuvastatin (CRESTOR) 5 MG tablet Take 1 tablet (5 mg total) by mouth once daily. 90 tablet 1    [DISCONTINUED] chlorthalidone (HYGROTEN) 25 MG Tab Take 1 tablet (25 mg total) by mouth every morning. With food 90 tablet 1    [DISCONTINUED] rizatriptan (MAXALT-MLT) 10 MG disintegrating tablet TAKE 1 TABLET BY MOUTH at onset OF HEADACHE AS DIRECTED, MAY REPEAT every 2 hours UNTIL RELIEF UP TO a maximum OF 3 tablets in 24 HOURS 12 tablet 5    [DISCONTINUED] telmisartan (MICARDIS) 40 MG Tab Take 1 tablet (40 mg total) by mouth once daily. 90 tablet 1    [DISCONTINUED] topiramate (TOPAMAX) 50 MG tablet Take 50 mg by mouth once daily.       Current Facility-Administered Medications for the 4/18/24 encounter (Office Visit) with Raúl Rothman FNP   Medication Dose Route Frequency Provider Last Rate Last Admin    [COMPLETED] dexAMETHasone injection 4 mg  4 mg Intramuscular 1 time in Clinic/HOD Raúl Rothman FNP   4 mg at 05/09/24 1647    [COMPLETED] ketorolac injection 60 mg  60 mg Intramuscular 1 time in Clinic/HOD Raúl Rothman FNP   60 mg at 05/09/24 1647       Allergies  Review of patient's allergies indicates:   Allergen Reactions     Azithromycin Itching    Betadine antiseptic gauze Hives       Physical Examination     Vitals:    04/18/24 1558   BP: 128/72   Pulse: 70   Resp: 20   Temp: 97.8 °F (36.6 °C)     Physical Exam  Vitals and nursing note reviewed.   Constitutional:       General: She is awake.      Appearance: Normal appearance.   HENT:      Head: Normocephalic.      Right Ear: Tympanic membrane, ear canal and external ear normal.      Left Ear: Tympanic membrane, ear canal and external ear normal.      Nose: Congestion present.      Right Sinus: Maxillary sinus tenderness present.      Left Sinus: Maxillary sinus tenderness present.      Mouth/Throat:      Lips: Pink.      Mouth: Mucous membranes are moist.      Pharynx: Oropharynx is clear. Uvula midline. Posterior oropharyngeal erythema present. No oropharyngeal exudate.   Cardiovascular:      Rate and Rhythm: Normal rate and regular rhythm.      Heart sounds: Normal heart sounds, S1 normal and S2 normal.   Pulmonary:      Effort: Pulmonary effort is normal. No respiratory distress.      Breath sounds: Normal breath sounds. No decreased breath sounds, wheezing, rhonchi or rales.   Abdominal:      General: Bowel sounds are normal.      Palpations: Abdomen is soft.      Tenderness: There is no abdominal tenderness.   Musculoskeletal:      Cervical back: Normal range of motion.   Lymphadenopathy:      Cervical: No cervical adenopathy.   Skin:     General: Skin is warm.      Capillary Refill: Capillary refill takes less than 2 seconds.   Neurological:      Mental Status: She is alert and oriented to person, place, and time.   Psychiatric:         Thought Content: Thought content does not include homicidal or suicidal ideation. Thought content does not include homicidal or suicidal plan.               Procedures   Assessment and Plan (including Health Maintenance)   :    Plan:     Problem List Items Addressed This Visit          ENT    Acute maxillary sinusitis - Primary    Current  Assessment & Plan     Rocephin IM and Decadron IM today. Cefdinir RX. Medication instructions and education given with understanding voiced. Rest, increase fluids. OTC antihistamines, decongestants, Ibuprofen, Tylenol as needed. RTC with worsening, new or persistent symptoms.           Sore throat    Current Assessment & Plan     Rapid flu and strep negative         Relevant Orders    POCT Strep A, Molecular (Completed)    POCT Influenza A/B Molecular (Completed)    Nasal congestion    Current Assessment & Plan     Rapid flu and strep negative         Relevant Orders    POCT Strep A, Molecular (Completed)    POCT Influenza A/B Molecular (Completed)       Health Maintenance Topics with due status: Not Due       Topic Last Completion Date    TETANUS VACCINE 06/28/2022    Lipid Panel 10/24/2022    Influenza Vaccine Not Due       No future appointments.     Health Maintenance Due   Topic Date Due    Hepatitis C Screening  Never done    HIV Screening  Never done    Mammogram  Never done    Hemoglobin A1c (Diabetic Prevention Screening)  Never done    Colorectal Cancer Screening  Never done    COVID-19 Vaccine (1 - 2023-24 season) Never done        Follow up if symptoms worsen or fail to improve.     Signature:  SHEY ELIZABETH  [address]  [address]    Date of encounter: 4/18/24

## 2024-05-09 ENCOUNTER — OFFICE VISIT (OUTPATIENT)
Dept: FAMILY MEDICINE | Facility: CLINIC | Age: 49
End: 2024-05-09
Payer: OTHER GOVERNMENT

## 2024-05-09 VITALS
WEIGHT: 215.63 LBS | RESPIRATION RATE: 18 BRPM | BODY MASS INDEX: 35.93 KG/M2 | HEART RATE: 79 BPM | SYSTOLIC BLOOD PRESSURE: 142 MMHG | DIASTOLIC BLOOD PRESSURE: 100 MMHG | OXYGEN SATURATION: 96 % | HEIGHT: 65 IN | TEMPERATURE: 98 F

## 2024-05-09 DIAGNOSIS — M25.512 ACUTE PAIN OF LEFT SHOULDER: ICD-10-CM

## 2024-05-09 DIAGNOSIS — G43.719 INTRACTABLE CHRONIC MIGRAINE WITHOUT AURA AND WITHOUT STATUS MIGRAINOSUS: ICD-10-CM

## 2024-05-09 DIAGNOSIS — I10 ESSENTIAL HYPERTENSION, BENIGN: Primary | ICD-10-CM

## 2024-05-09 DIAGNOSIS — B02.9 HERPES ZOSTER WITHOUT COMPLICATION: ICD-10-CM

## 2024-05-09 DIAGNOSIS — G43.C1 INTRACTABLE PERIODIC HEADACHE SYNDROME: ICD-10-CM

## 2024-05-09 PROCEDURE — 96372 THER/PROPH/DIAG INJ SC/IM: CPT | Mod: ,,,

## 2024-05-09 PROCEDURE — 99213 OFFICE O/P EST LOW 20 MIN: CPT | Mod: 25,,,

## 2024-05-09 RX ORDER — DEXAMETHASONE SODIUM PHOSPHATE 4 MG/ML
4 INJECTION, SOLUTION INTRA-ARTICULAR; INTRALESIONAL; INTRAMUSCULAR; INTRAVENOUS; SOFT TISSUE
Status: COMPLETED | OUTPATIENT
Start: 2024-05-09 | End: 2024-05-09

## 2024-05-09 RX ORDER — GABAPENTIN 300 MG/1
300 CAPSULE ORAL 2 TIMES DAILY
Qty: 60 CAPSULE | Refills: 1 | Status: SHIPPED | OUTPATIENT
Start: 2024-05-09

## 2024-05-09 RX ORDER — TELMISARTAN 40 MG/1
40 TABLET ORAL DAILY
Qty: 90 TABLET | Refills: 1 | Status: SHIPPED | OUTPATIENT
Start: 2024-05-09

## 2024-05-09 RX ORDER — RIZATRIPTAN BENZOATE 10 MG/1
TABLET, ORALLY DISINTEGRATING ORAL
Qty: 12 TABLET | Refills: 5 | Status: SHIPPED | OUTPATIENT
Start: 2024-05-09

## 2024-05-09 RX ORDER — VALACYCLOVIR HYDROCHLORIDE 1 G/1
1000 TABLET, FILM COATED ORAL 2 TIMES DAILY
Qty: 60 TABLET | Refills: 11 | Status: SHIPPED | OUTPATIENT
Start: 2024-05-09 | End: 2025-05-09

## 2024-05-09 RX ORDER — CHLORTHALIDONE 25 MG/1
25 TABLET ORAL EVERY MORNING
Qty: 90 TABLET | Refills: 1 | Status: SHIPPED | OUTPATIENT
Start: 2024-05-09

## 2024-05-09 RX ORDER — KETOROLAC TROMETHAMINE 30 MG/ML
60 INJECTION, SOLUTION INTRAMUSCULAR; INTRAVENOUS
Status: COMPLETED | OUTPATIENT
Start: 2024-05-09 | End: 2024-05-09

## 2024-05-09 RX ORDER — TOPIRAMATE 50 MG/1
50 TABLET, FILM COATED ORAL DAILY
Qty: 90 TABLET | Refills: 1 | Status: SHIPPED | OUTPATIENT
Start: 2024-05-09

## 2024-05-09 RX ORDER — KETOROLAC TROMETHAMINE 10 MG/1
10 TABLET, FILM COATED ORAL EVERY 6 HOURS
Qty: 20 TABLET | Refills: 0 | Status: SHIPPED | OUTPATIENT
Start: 2024-05-09 | End: 2024-05-14

## 2024-05-09 RX ADMIN — DEXAMETHASONE SODIUM PHOSPHATE 4 MG: 4 INJECTION, SOLUTION INTRA-ARTICULAR; INTRALESIONAL; INTRAMUSCULAR; INTRAVENOUS; SOFT TISSUE at 04:05

## 2024-05-09 RX ADMIN — KETOROLAC TROMETHAMINE 60 MG: 30 INJECTION, SOLUTION INTRAMUSCULAR; INTRAVENOUS at 04:05

## 2024-05-09 NOTE — PROGRESS NOTES
"   DANITZA PARKER, SHEY   [facility]  [address]  [place]  [address]      PATIENT NAME: Ana Maria  : 1975  DATE: 24  MRN: [id number]                History of Present Illness / Problem Focused Workflow     47 y/o female presents to clinic with complaints of  Left pectoral pain x 1 week. Patient states it hurts in her left pectoral muscles and radiates  around to her left shoulder blade. She also reports a " fluid sac" just under her left armpit and 3 red raised spots that burn and hurt on her left side of chest and left arm that popped up 4-5 days.) and Medication Refill (Patient also needs a few medications refilled.)      Review of Systems     @Review of Systems   Constitutional:  Negative for chills, fatigue and fever.   HENT:  Negative for nasal congestion, ear discharge, ear pain, rhinorrhea, sinus pressure/congestion and sore throat.    Respiratory:  Negative for cough, chest tightness, shortness of breath, wheezing and stridor.    Cardiovascular:  Negative for palpitations and claudication.   Gastrointestinal:  Negative for abdominal pain, constipation, diarrhea, nausea, vomiting and reflux.   Genitourinary:  Negative for dysuria, flank pain, frequency, hematuria and urgency.   Musculoskeletal:  Positive for joint swelling (right shoulder pain). Negative for myalgias.   Neurological:  Negative for dizziness, weakness, light-headedness and headaches.   Psychiatric/Behavioral:  Negative for suicidal ideas.        Medical / Social / Family History     Past Medical History:   Diagnosis Date    Depression     Hyperlipidemia     Hypertension     Migraine        Past Surgical History:   Procedure Laterality Date    HERNIA REPAIR  2019    umbilical hernia repair:     HYSTERECTOMY  2018    total    PRE-MALIGNANT / BENIGN SKIN LESION EXCISION      sebaceous cyst    TUBAL LIGATION  2012           Medications and Allergies     Medications  Outpatient Medications Marked as Taking " for the 5/9/24 encounter (Office Visit) with Raúl Rothman FNP   Medication Sig Dispense Refill    FLUoxetine 20 MG capsule Take 20 mg by mouth once daily.      ibuprofen (ADVIL,MOTRIN) 800 MG tablet Take 1 tablet (800 mg total) by mouth 2 (two) times daily as needed for Pain. 40 tablet 0    ondansetron (ZOFRAN-ODT) 8 MG TbDL Take 8 mg by mouth daily as needed.      [DISCONTINUED] chlorthalidone (HYGROTEN) 25 MG Tab Take 1 tablet (25 mg total) by mouth every morning. With food 90 tablet 1    [DISCONTINUED] rizatriptan (MAXALT-MLT) 10 MG disintegrating tablet TAKE 1 TABLET BY MOUTH at onset OF HEADACHE AS DIRECTED, MAY REPEAT every 2 hours UNTIL RELIEF UP TO a maximum OF 3 tablets in 24 HOURS 12 tablet 5    [DISCONTINUED] telmisartan (MICARDIS) 40 MG Tab Take 1 tablet (40 mg total) by mouth once daily. 90 tablet 1    [DISCONTINUED] topiramate (TOPAMAX) 50 MG tablet Take 50 mg by mouth once daily.         Allergies  Review of patient's allergies indicates:   Allergen Reactions    Azithromycin Itching    Betadine antiseptic gauze Hives       Physical Examination     Vitals:    05/09/24 1613   BP: (!) 142/100   Pulse:    Resp:    Temp:      Physical Exam  Vitals and nursing note reviewed.   Constitutional:       General: She is awake.      Appearance: Normal appearance.   HENT:      Head: Normocephalic.      Right Ear: Tympanic membrane, ear canal and external ear normal.      Left Ear: Tympanic membrane, ear canal and external ear normal.      Nose: Nose normal.      Mouth/Throat:      Lips: Pink.      Mouth: Mucous membranes are moist.      Pharynx: Oropharynx is clear. Uvula midline.   Cardiovascular:      Rate and Rhythm: Normal rate and regular rhythm.      Heart sounds: Normal heart sounds, S1 normal and S2 normal.   Pulmonary:      Effort: Pulmonary effort is normal. No respiratory distress.      Breath sounds: Normal breath sounds. No decreased breath sounds, wheezing, rhonchi or rales.   Abdominal:       General: Bowel sounds are normal.      Palpations: Abdomen is soft.      Tenderness: There is no abdominal tenderness.   Musculoskeletal:      Cervical back: Normal range of motion.   Skin:     General: Skin is warm.      Capillary Refill: Capillary refill takes less than 2 seconds.      Findings: Rash present. Rash is vesicular.      Comments: Rash noted under right shoulder blade around under right arm.   Neurological:      Mental Status: She is alert and oriented to person, place, and time.   Psychiatric:         Thought Content: Thought content does not include homicidal or suicidal ideation. Thought content does not include homicidal or suicidal plan.               Procedures   Assessment and Plan (including Health Maintenance)   :    Plan:     Problem List Items Addressed This Visit          Neuro    Intractable chronic migraine without aura and without status migrainosus (Chronic)    Relevant Medications    rizatriptan (MAXALT-MLT) 10 MG disintegrating tablet    topiramate (TOPAMAX) 50 MG tablet    Intractable periodic headache syndrome       Cardiac/Vascular    Essential hypertension, benign - Primary    Current Assessment & Plan     Pt has not taken medication today and she is in pain due to shoulder. Instructed to keep watch on BP and RTC if staying elevated.    Monitor BP daily and keep BP daily log to bring to next visit. Exercise at least 5 times a week. Keep scheduled appts. RTC sooner if BP is staying <120/70. Dash diet.    DASH- includes foods rich in K+, calcium and Magnesium.The diet limits foods high in sodium, saturated fats and added sugars. This is a flexible balanced eating plan that helps create heart healthy eating style for life. Diet is rich in vegetable, whole grains and fruits. It includes fat free or low fat dairy products, fish, poultry, nuts. Chose foods high in K+, calcium, magnesium, fiber, protein, and low in saturated fats and sodium.          Relevant Medications     chlorthalidone (HYGROTEN) 25 MG Tab    telmisartan (MICARDIS) 40 MG Tab       ID    Herpes zoster without complication    Current Assessment & Plan     Toradol IM and Decadron IM today. Valtrex, gabapentin and toradol RX today. Medication instructions and education given with understanding voiced. RTC with worsening, new or persistent symptoms.         Relevant Medications    valACYclovir (VALTREX) 1000 MG tablet    gabapentin (NEURONTIN) 300 MG capsule       Orthopedic    Acute pain of left shoulder       Health Maintenance Topics with due status: Not Due       Topic Last Completion Date    TETANUS VACCINE 06/28/2022    Lipid Panel 10/24/2022    Influenza Vaccine Not Due       No future appointments.     Health Maintenance Due   Topic Date Due    Hepatitis C Screening  Never done    HIV Screening  Never done    Mammogram  Never done    Hemoglobin A1c (Diabetic Prevention Screening)  Never done    Colorectal Cancer Screening  Never done    COVID-19 Vaccine (1 - 2023-24 season) Never done        Follow up in about 3 months (around 8/9/2024), or if symptoms worsen or fail to improve.     Signature:  SHEY ELIZABETH  [facility]  [address] [place] [address]  [id number]    Date of encounter: 5/9/24

## 2024-05-09 NOTE — PROGRESS NOTES
Health Maintenance Due   Topic Date Due    Hepatitis C Screening  Never done    HIV Screening  Never done    Mammogram  Never done    Hemoglobin A1c (Diabetic Prevention Screening)  Never done    Colorectal Cancer Screening  Never done    COVID-19 Vaccine (1 - 2023-24 season) Never done     I discussed care gaps with patient and she would like set up for a mammogram over the summer when she is not at school. She would like to wait on the colonoscopy and blood work and labs until future visit.

## 2024-05-10 PROBLEM — J01.00 ACUTE MAXILLARY SINUSITIS: Status: ACTIVE | Noted: 2024-05-10

## 2024-05-10 PROBLEM — J02.9 SORE THROAT: Status: ACTIVE | Noted: 2024-05-10

## 2024-05-10 PROBLEM — R09.81 NASAL CONGESTION: Status: ACTIVE | Noted: 2024-05-10

## 2024-05-10 NOTE — ASSESSMENT & PLAN NOTE
Rocephin IM and Decadron IM today. Cefdinir RX. Medication instructions and education given with understanding voiced. Rest, increase fluids. OTC antihistamines, decongestants, Ibuprofen, Tylenol as needed. RTC with worsening, new or persistent symptoms.

## 2024-05-28 PROBLEM — G43.C1 INTRACTABLE PERIODIC HEADACHE SYNDROME: Status: ACTIVE | Noted: 2024-05-28

## 2024-05-28 PROBLEM — B02.9 HERPES ZOSTER WITHOUT COMPLICATION: Status: ACTIVE | Noted: 2024-05-28

## 2024-05-28 PROBLEM — M25.512 ACUTE PAIN OF LEFT SHOULDER: Status: ACTIVE | Noted: 2024-05-28

## 2024-05-28 NOTE — ASSESSMENT & PLAN NOTE
Toradol IM and Decadron IM today. Valtrex, gabapentin and toradol RX today. Medication instructions and education given with understanding voiced. RTC with worsening, new or persistent symptoms.

## 2024-05-28 NOTE — ASSESSMENT & PLAN NOTE
Pt has not taken medication today and she is in pain due to shoulder. Instructed to keep watch on BP and RTC if staying elevated.    Monitor BP daily and keep BP daily log to bring to next visit. Exercise at least 5 times a week. Keep scheduled appts. RTC sooner if BP is staying <120/70. Dash diet.    DASH- includes foods rich in K+, calcium and Magnesium.The diet limits foods high in sodium, saturated fats and added sugars. This is a flexible balanced eating plan that helps create heart healthy eating style for life. Diet is rich in vegetable, whole grains and fruits. It includes fat free or low fat dairy products, fish, poultry, nuts. Chose foods high in K+, calcium, magnesium, fiber, protein, and low in saturated fats and sodium.

## 2024-07-05 DIAGNOSIS — J01.00 ACUTE MAXILLARY SINUSITIS, RECURRENCE NOT SPECIFIED: Primary | ICD-10-CM

## 2024-07-05 RX ORDER — METHYLPREDNISOLONE 4 MG/1
TABLET ORAL
Qty: 21 EACH | Refills: 0 | Status: SHIPPED | OUTPATIENT
Start: 2024-07-05 | End: 2024-07-26

## 2024-07-05 RX ORDER — AMOXICILLIN AND CLAVULANATE POTASSIUM 875; 125 MG/1; MG/1
1 TABLET, FILM COATED ORAL EVERY 12 HOURS
Qty: 20 TABLET | Refills: 0 | Status: SHIPPED | OUTPATIENT
Start: 2024-07-05 | End: 2024-07-15

## 2024-08-05 DIAGNOSIS — R30.0 DYSURIA: Primary | ICD-10-CM

## 2024-08-05 RX ORDER — CEPHALEXIN 500 MG/1
500 CAPSULE ORAL EVERY 12 HOURS
Qty: 14 CAPSULE | Refills: 0 | Status: SHIPPED | OUTPATIENT
Start: 2024-08-05 | End: 2024-08-12

## 2024-08-12 PROBLEM — J01.00 ACUTE MAXILLARY SINUSITIS: Status: RESOLVED | Noted: 2024-05-10 | Resolved: 2024-08-12

## 2024-08-23 ENCOUNTER — OFFICE VISIT (OUTPATIENT)
Dept: FAMILY MEDICINE | Facility: CLINIC | Age: 49
End: 2024-08-23
Payer: OTHER GOVERNMENT

## 2024-08-23 VITALS
HEART RATE: 70 BPM | SYSTOLIC BLOOD PRESSURE: 128 MMHG | BODY MASS INDEX: 35.05 KG/M2 | DIASTOLIC BLOOD PRESSURE: 87 MMHG | TEMPERATURE: 98 F | HEIGHT: 65 IN | WEIGHT: 210.38 LBS | OXYGEN SATURATION: 96 % | RESPIRATION RATE: 18 BRPM

## 2024-08-23 DIAGNOSIS — M25.531 RIGHT WRIST PAIN: Primary | ICD-10-CM

## 2024-08-23 PROCEDURE — 96372 THER/PROPH/DIAG INJ SC/IM: CPT | Mod: ,,,

## 2024-08-23 PROCEDURE — 99213 OFFICE O/P EST LOW 20 MIN: CPT | Mod: 25,,,

## 2024-08-23 RX ORDER — KETOROLAC TROMETHAMINE 30 MG/ML
60 INJECTION, SOLUTION INTRAMUSCULAR; INTRAVENOUS
Status: COMPLETED | OUTPATIENT
Start: 2024-08-23 | End: 2024-08-23

## 2024-08-23 RX ORDER — DEXAMETHASONE SODIUM PHOSPHATE 4 MG/ML
4 INJECTION, SOLUTION INTRA-ARTICULAR; INTRALESIONAL; INTRAMUSCULAR; INTRAVENOUS; SOFT TISSUE
Status: COMPLETED | OUTPATIENT
Start: 2024-08-23 | End: 2024-08-23

## 2024-08-23 RX ORDER — METHYLPREDNISOLONE 4 MG/1
TABLET ORAL
Qty: 21 EACH | Refills: 0 | Status: SHIPPED | OUTPATIENT
Start: 2024-08-23 | End: 2024-09-13

## 2024-08-23 RX ORDER — KETOROLAC TROMETHAMINE 10 MG/1
10 TABLET, FILM COATED ORAL EVERY 6 HOURS
Qty: 20 TABLET | Refills: 0 | Status: SHIPPED | OUTPATIENT
Start: 2024-08-23 | End: 2024-08-28

## 2024-08-23 RX ADMIN — KETOROLAC TROMETHAMINE 60 MG: 30 INJECTION, SOLUTION INTRAMUSCULAR; INTRAVENOUS at 04:08

## 2024-08-23 RX ADMIN — DEXAMETHASONE SODIUM PHOSPHATE 4 MG: 4 INJECTION, SOLUTION INTRA-ARTICULAR; INTRALESIONAL; INTRAMUSCULAR; INTRAVENOUS; SOFT TISSUE at 04:08

## 2024-08-23 NOTE — ASSESSMENT & PLAN NOTE
Xray right wrist read as normal. Toradol IM and Decadron IM today. Toradol and medrol dose pack RX. Medication instructions and education given with understanding voiced. RTC with worsening, new or persistent symptoms for further evaluation

## 2024-08-23 NOTE — PROGRESS NOTES
Health Maintenance Due   Topic Date Due    Hepatitis C Screening  Never done    HIV Screening  Never done    Mammogram  Never done    Hemoglobin A1c (Diabetic Prevention Screening)  Never done    Colorectal Cancer Screening  Never done    COVID-19 Vaccine (1 - 2023-24 season) Never done     I have discussed care gaps with patient and she defers all at this time.

## 2024-08-23 NOTE — PROGRESS NOTES
SHEY ELIZABETH   [facility]  [address]  [place]  [address]      PATIENT NAME: Ana Maria  : 1975  DATE: 24  MRN: [id number]        Reason for Visit / Chief Complaint: Wrist Pain (Patient is Ana aldana 50 y/o female that reports she is having right wrist pain. She states she woke up Tuesday morning and it was hurting. Patient denies any injury to it. Patient states she is still having pain in and yesterday afternoon it started swelling. Patient states she has taken ibuprofen 600 and iced it and states it did not help it. Patient states she would like an xray of her wrist.)         History of Present Illness / Problem Focused Workflow     Patient is Ana aldana 50 y/o female that reports she is having right wrist pain. She states she woke up Tuesday morning and it was hurting. Patient denies any injury to it. Patient states she is still having pain in and yesterday afternoon it started swelling. Patient states she has taken ibuprofen 600 and iced it and states it did not help it. Patient states she would like an xray of her wrist.       Review of Systems     @Review of Systems   Constitutional:  Negative for chills, fatigue and fever.   HENT:  Negative for nasal congestion, ear discharge, ear pain, rhinorrhea, sinus pressure/congestion and sore throat.    Respiratory:  Negative for cough, chest tightness, shortness of breath, wheezing and stridor.    Cardiovascular:  Negative for palpitations and claudication.   Gastrointestinal:  Negative for abdominal pain, constipation, diarrhea, nausea, vomiting and reflux.   Genitourinary:  Negative for dysuria, flank pain, frequency, hematuria and urgency.   Musculoskeletal:  Positive for joint swelling (right wrist pain). Negative for myalgias.   Neurological:  Negative for dizziness, weakness, light-headedness and headaches.   Psychiatric/Behavioral:  Negative for suicidal ideas.        Medical / Social / Family  History     Past Medical History:   Diagnosis Date    Depression     Hyperlipidemia     Hypertension     Migraine        Past Surgical History:   Procedure Laterality Date    HERNIA REPAIR  2019    umbilical hernia repair:     HYSTERECTOMY  2018    total    PRE-MALIGNANT / BENIGN SKIN LESION EXCISION      sebaceous cyst    TUBAL LIGATION  2012           Medications and Allergies     Medications  Outpatient Medications Marked as Taking for the 8/23/24 encounter (Office Visit) with Raúl Rothman FNP   Medication Sig Dispense Refill    chlorthalidone (HYGROTEN) 25 MG Tab Take 1 tablet (25 mg total) by mouth every morning. With food 90 tablet 1    FLUoxetine 20 MG capsule Take 20 mg by mouth once daily.      gabapentin (NEURONTIN) 300 MG capsule Take 1 capsule (300 mg total) by mouth 2 (two) times daily. 60 capsule 1    ibuprofen (ADVIL,MOTRIN) 800 MG tablet Take 1 tablet (800 mg total) by mouth 2 (two) times daily as needed for Pain. 40 tablet 0    metFORMIN (GLUCOPHAGE-XR) 500 MG ER 24hr tablet Take 500 mg by mouth once daily.      ondansetron (ZOFRAN-ODT) 8 MG TbDL Take 8 mg by mouth daily as needed.      rizatriptan (MAXALT-MLT) 10 MG disintegrating tablet TAKE 1 TABLET BY MOUTH at onset OF HEADACHE AS DIRECTED, MAY REPEAT every 2 hours UNTIL RELIEF UP TO a maximum OF 3 tablets in 24 HOURS 12 tablet 5    rosuvastatin (CRESTOR) 5 MG tablet Take 1 tablet (5 mg total) by mouth once daily. 90 tablet 1    telmisartan (MICARDIS) 40 MG Tab Take 1 tablet (40 mg total) by mouth once daily. 90 tablet 1    topiramate (TOPAMAX) 50 MG tablet Take 1 tablet (50 mg total) by mouth once daily. 90 tablet 1    valACYclovir (VALTREX) 1000 MG tablet Take 1 tablet (1,000 mg total) by mouth 2 (two) times daily. 60 tablet 11     Current Facility-Administered Medications for the 8/23/24 encounter (Office Visit) with Raúl Rothman FNP   Medication Dose Route Frequency Provider Last Rate Last Admin    dexAMETHasone injection 4 mg   4 mg Intramuscular 1 time in Clinic/HOD Raúl Rothman FNP        ketorolac injection 60 mg  60 mg Intramuscular 1 time in Clinic/HOD Raúl Rothman FNP           Allergies  Review of patient's allergies indicates:   Allergen Reactions    Azithromycin Itching    Betadine antiseptic gauze Hives       Physical Examination     Vitals:    08/23/24 1544   BP: 128/87   Pulse: 70   Resp: 18   Temp: 98 °F (36.7 °C)     Physical Exam  Vitals and nursing note reviewed.   Constitutional:       General: She is awake.      Appearance: Normal appearance.   HENT:      Head: Normocephalic.      Right Ear: Tympanic membrane, ear canal and external ear normal.      Left Ear: Tympanic membrane, ear canal and external ear normal.      Nose: Nose normal.      Mouth/Throat:      Lips: Pink.      Mouth: Mucous membranes are moist.      Pharynx: Oropharynx is clear. Uvula midline.   Cardiovascular:      Rate and Rhythm: Normal rate and regular rhythm.      Heart sounds: Normal heart sounds, S1 normal and S2 normal.   Pulmonary:      Effort: Pulmonary effort is normal. No respiratory distress.      Breath sounds: Normal breath sounds. No decreased breath sounds, wheezing, rhonchi or rales.   Abdominal:      General: Bowel sounds are normal.      Palpations: Abdomen is soft.      Tenderness: There is no abdominal tenderness.   Musculoskeletal:      Right wrist: Swelling and tenderness present.      Cervical back: Normal range of motion.      Comments: Strength 5/5 in bilateral wrist and hand. Mild edema noted to lateral right wrist with TTP    Skin:     General: Skin is warm.      Capillary Refill: Capillary refill takes less than 2 seconds.   Neurological:      Mental Status: She is alert and oriented to person, place, and time.   Psychiatric:         Thought Content: Thought content does not include homicidal or suicidal ideation. Thought content does not include homicidal or suicidal plan.               Procedures   Assessment and  Plan (including Health Maintenance)   :    Plan:     Problem List Items Addressed This Visit          Orthopedic    Right wrist pain - Primary    Current Assessment & Plan     Xray right wrist read as normal. Toradol IM and Decadron IM today. Toradol and medrol dose pack RX. Medication instructions and education given with understanding voiced. RTC with worsening, new or persistent symptoms for further evaluation         Relevant Medications    ketorolac injection 60 mg    dexAMETHasone injection 4 mg    ketorolac (TORADOL) 10 mg tablet    methylPREDNISolone (MEDROL DOSEPACK) 4 mg tablet    Other Relevant Orders    X-Ray Wrist Complete 3 views Right (Completed)       Health Maintenance Topics with due status: Not Due       Topic Last Completion Date    TETANUS VACCINE 06/28/2022    Lipid Panel 10/24/2022    Influenza Vaccine Not Due       No future appointments.     Health Maintenance Due   Topic Date Due    Hepatitis C Screening  Never done    HIV Screening  Never done    Mammogram  Never done    Hemoglobin A1c (Diabetic Prevention Screening)  Never done    Colorectal Cancer Screening  Never done    COVID-19 Vaccine (1 - 2023-24 season) Never done        Follow up if symptoms worsen or fail to improve.     Signature:  SHEY ELIZABETH  [facility]  [address] [place] [address], [place]  [address]    Date of encounter: 8/23/24

## 2024-10-25 DIAGNOSIS — G43.719 INTRACTABLE CHRONIC MIGRAINE WITHOUT AURA AND WITHOUT STATUS MIGRAINOSUS: ICD-10-CM

## 2024-10-28 RX ORDER — RIZATRIPTAN BENZOATE 10 MG/1
TABLET, ORALLY DISINTEGRATING ORAL
Qty: 12 TABLET | Refills: 5 | Status: SHIPPED | OUTPATIENT
Start: 2024-10-28

## 2024-11-08 ENCOUNTER — OFFICE VISIT (OUTPATIENT)
Dept: FAMILY MEDICINE | Facility: CLINIC | Age: 49
End: 2024-11-08
Payer: OTHER GOVERNMENT

## 2024-11-08 VITALS
HEIGHT: 65 IN | TEMPERATURE: 98 F | BODY MASS INDEX: 35.05 KG/M2 | OXYGEN SATURATION: 97 % | HEART RATE: 65 BPM | DIASTOLIC BLOOD PRESSURE: 100 MMHG | RESPIRATION RATE: 19 BRPM | WEIGHT: 210.38 LBS | SYSTOLIC BLOOD PRESSURE: 145 MMHG

## 2024-11-08 DIAGNOSIS — H66.90 OTITIS MEDIA, UNSPECIFIED LATERALITY, UNSPECIFIED OTITIS MEDIA TYPE: Primary | ICD-10-CM

## 2024-11-08 DIAGNOSIS — G43.719 INTRACTABLE CHRONIC MIGRAINE WITHOUT AURA AND WITHOUT STATUS MIGRAINOSUS: ICD-10-CM

## 2024-11-08 PROCEDURE — 99213 OFFICE O/P EST LOW 20 MIN: CPT | Mod: 25,,,

## 2024-11-08 PROCEDURE — 96372 THER/PROPH/DIAG INJ SC/IM: CPT | Mod: ,,,

## 2024-11-08 RX ORDER — RIZATRIPTAN BENZOATE 10 MG/1
TABLET, ORALLY DISINTEGRATING ORAL
Qty: 12 TABLET | Refills: 5 | Status: SHIPPED | OUTPATIENT
Start: 2024-11-08

## 2024-11-08 RX ORDER — AMOXICILLIN 500 MG/1
500 TABLET, FILM COATED ORAL EVERY 12 HOURS
Qty: 20 TABLET | Refills: 0 | Status: SHIPPED | OUTPATIENT
Start: 2024-11-08 | End: 2024-11-18

## 2024-11-08 RX ORDER — CEFTRIAXONE 1 G/1
1 INJECTION, POWDER, FOR SOLUTION INTRAMUSCULAR; INTRAVENOUS
Status: COMPLETED | OUTPATIENT
Start: 2024-11-08 | End: 2024-11-08

## 2024-11-08 RX ORDER — OFLOXACIN 3 MG/ML
5 SOLUTION AURICULAR (OTIC) 2 TIMES DAILY
Qty: 10 ML | Refills: 0 | Status: SHIPPED | OUTPATIENT
Start: 2024-11-08

## 2024-11-08 RX ORDER — DEXAMETHASONE SODIUM PHOSPHATE 4 MG/ML
4 INJECTION, SOLUTION INTRA-ARTICULAR; INTRALESIONAL; INTRAMUSCULAR; INTRAVENOUS; SOFT TISSUE
Status: COMPLETED | OUTPATIENT
Start: 2024-11-08 | End: 2024-11-08

## 2024-11-08 RX ORDER — KETOROLAC TROMETHAMINE 30 MG/ML
60 INJECTION, SOLUTION INTRAMUSCULAR; INTRAVENOUS
Status: COMPLETED | OUTPATIENT
Start: 2024-11-08 | End: 2024-11-08

## 2024-11-08 RX ADMIN — CEFTRIAXONE 1 G: 1 INJECTION, POWDER, FOR SOLUTION INTRAMUSCULAR; INTRAVENOUS at 04:11

## 2024-11-08 RX ADMIN — DEXAMETHASONE SODIUM PHOSPHATE 4 MG: 4 INJECTION, SOLUTION INTRA-ARTICULAR; INTRALESIONAL; INTRAMUSCULAR; INTRAVENOUS; SOFT TISSUE at 04:11

## 2024-11-08 RX ADMIN — KETOROLAC TROMETHAMINE 60 MG: 30 INJECTION, SOLUTION INTRAMUSCULAR; INTRAVENOUS at 04:11

## 2024-11-08 NOTE — PROGRESS NOTES
SHEY ELIZABETH   [facility]  [address]  [place]  [address]      PATIENT NAME: Ana Maria  : 1975  DATE: 24  MRN: [id number]        Reason for Visit / Chief Complaint: Otalgia (Brenna Maria 49 year old c/f presents with left ear pain x1 week.), Headache (Headache to left side of head and left ear ache x1 week. Patient declined all POCT testing at this visit. Has not taken Blood pressure medication today. Has taken Maxalt every day this week.), and Medication Refill (Maxalt refill requested)         History of Present Illness / Problem Focused Workflow       Brenna Maria 49 year old c/f presents with left ear pain x1 week.   Headache Headache to left side of head and left ear ache x1 week. Patient declined all POCT testing at this visit. Has not taken Blood pressure medication today. Has taken Maxalt every day this week.  Medication Refill Maxalt refill requested          Review of Systems     @Review of Systems   Constitutional:  Negative for chills, fatigue and fever.   HENT:  Positive for ear pain. Negative for nasal congestion, ear discharge, rhinorrhea, sinus pressure/congestion and sore throat.    Respiratory:  Negative for cough, chest tightness, shortness of breath, wheezing and stridor.    Cardiovascular:  Negative for palpitations and claudication.   Gastrointestinal:  Negative for abdominal pain, constipation, diarrhea, nausea, vomiting and reflux.   Genitourinary:  Negative for dysuria, flank pain, frequency, hematuria and urgency.   Musculoskeletal:  Negative for myalgias.   Neurological:  Positive for headaches. Negative for dizziness, weakness and light-headedness.   Psychiatric/Behavioral:  Negative for suicidal ideas.        Medical / Social / Family History     Past Medical History:   Diagnosis Date    Depression     Hyperlipidemia     Hypertension     Migraine        Past Surgical History:   Procedure Laterality Date    HERNIA REPAIR  2019    umbilical  hernia repair:     HYSTERECTOMY  2018    total    PRE-MALIGNANT / BENIGN SKIN LESION EXCISION      sebaceous cyst    TUBAL LIGATION  2012           Medications and Allergies     Medications  Outpatient Medications Marked as Taking for the 11/8/24 encounter (Office Visit) with Raúl Rothman FNP   Medication Sig Dispense Refill    chlorthalidone (HYGROTEN) 25 MG Tab Take 1 tablet (25 mg total) by mouth every morning. With food 90 tablet 1    FLUoxetine 20 MG capsule Take 20 mg by mouth once daily.      gabapentin (NEURONTIN) 300 MG capsule Take 1 capsule (300 mg total) by mouth 2 (two) times daily. 60 capsule 1    ibuprofen (ADVIL,MOTRIN) 800 MG tablet Take 1 tablet (800 mg total) by mouth 2 (two) times daily as needed for Pain. 40 tablet 0    metFORMIN (GLUCOPHAGE-XR) 500 MG ER 24hr tablet Take 500 mg by mouth once daily.      ondansetron (ZOFRAN-ODT) 8 MG TbDL Take 8 mg by mouth daily as needed.      rosuvastatin (CRESTOR) 5 MG tablet Take 1 tablet (5 mg total) by mouth once daily. 90 tablet 1    topiramate (TOPAMAX) 50 MG tablet Take 1 tablet (50 mg total) by mouth once daily. 90 tablet 1    valACYclovir (VALTREX) 1000 MG tablet Take 1 tablet (1,000 mg total) by mouth 2 (two) times daily. 60 tablet 11    [DISCONTINUED] rizatriptan (MAXALT-MLT) 10 MG disintegrating tablet dissolve 1 tablet on tongue AT ONSET OF HEADACHE, MAY REPEAT EVERY 2 HOURS UNTIL RELIEF UP TO A MAX OF 3 TABLETS in 24 HOURS 12 tablet 5    [DISCONTINUED] telmisartan (MICARDIS) 40 MG Tab Take 1 tablet (40 mg total) by mouth once daily. 90 tablet 1       Allergies  Review of patient's allergies indicates:   Allergen Reactions    Azithromycin Itching    Betadine antiseptic gauze Hives       Physical Examination     Vitals:    11/08/24 1543   BP: (!) 145/100   Pulse: 65   Resp: 19   Temp: 98.4 °F (36.9 °C)     Physical Exam  Vitals and nursing note reviewed.   Constitutional:       General: She is awake.      Appearance: Normal appearance.    HENT:      Head: Normocephalic.      Right Ear: Tympanic membrane, ear canal and external ear normal.      Left Ear: Ear canal and external ear normal. A middle ear effusion is present. Tympanic membrane is erythematous.      Nose: Congestion present.      Mouth/Throat:      Lips: Pink.      Mouth: Mucous membranes are moist.      Pharynx: Oropharynx is clear. Uvula midline. Posterior oropharyngeal erythema present.   Cardiovascular:      Rate and Rhythm: Normal rate and regular rhythm.      Heart sounds: Normal heart sounds, S1 normal and S2 normal.   Pulmonary:      Effort: Pulmonary effort is normal. No respiratory distress.      Breath sounds: Normal breath sounds. No decreased breath sounds, wheezing, rhonchi or rales.   Abdominal:      General: Bowel sounds are normal.      Palpations: Abdomen is soft.      Tenderness: There is no abdominal tenderness.   Musculoskeletal:      Cervical back: Normal range of motion.   Skin:     General: Skin is warm.      Capillary Refill: Capillary refill takes less than 2 seconds.   Neurological:      Mental Status: She is alert and oriented to person, place, and time.   Psychiatric:         Thought Content: Thought content does not include homicidal or suicidal ideation. Thought content does not include homicidal or suicidal plan.               Procedures   Assessment and Plan (including Health Maintenance)   :      Problem List Items Addressed This Visit       Intractable chronic migraine without aura and without status migrainosus (Chronic)    Current Assessment & Plan     Symptoms controlled well with Maxalt prn. Continue current regimen. Medication refilled today         Relevant Medications    rizatriptan (MAXALT-MLT) 10 MG disintegrating tablet    Otitis media - Primary    Current Assessment & Plan     Amoxicillin and Ofloxacin RX. Rocephin IM and Decadron IM today. Medication instructions given with understanding voiced.      Reviewed pathology of current symptoms,  medication side effects/risk/benefits/directions on taking medications, and worsening or persistent symptoms that require  follow up in next 2-3 days. Steroid nasal sprays or decongestant use as needed, and Tylenol or Motrin as needed for pain and/or fever and increase fluid intake. Patient was instructed to take antibiotic as directed, complete entire course to avoid antibiotic resistance, and take OTC probiotic with antibiotic to prevent GI upset. All medication benefits and risks discussed with patient. Instructed the patient to return if symptoms persist or worsen.           Relevant Medications    ofloxacin (FLOXIN) 0.3 % otic solution       Health Maintenance Topics with due status: Not Due       Topic Last Completion Date    TETANUS VACCINE 06/28/2022    Lipid Panel 10/24/2022    RSV Vaccine (Age 60+ and Pregnant patients) Not Due       No future appointments.     Health Maintenance Due   Topic Date Due    Hepatitis C Screening  Never done    HIV Screening  Never done    Mammogram  Never done    Hemoglobin A1c (Diabetic Prevention Screening)  Never done    Colorectal Cancer Screening  Never done    Influenza Vaccine (1) Never done    COVID-19 Vaccine (1 - 2024-25 season) Never done        Follow up if symptoms worsen or fail to improve.     Signature:  SHEY ELIZABETH  [facility]  [address] [address] [place]  [address]    Date of encounter: 11/8/24

## 2024-11-21 DIAGNOSIS — I10 ESSENTIAL HYPERTENSION, BENIGN: ICD-10-CM

## 2024-11-21 RX ORDER — TELMISARTAN 40 MG/1
40 TABLET ORAL DAILY
Qty: 90 TABLET | Refills: 1 | Status: SHIPPED | OUTPATIENT
Start: 2024-11-21

## 2024-12-03 PROBLEM — H66.90 OTITIS MEDIA: Status: ACTIVE | Noted: 2024-12-03

## 2024-12-03 NOTE — ASSESSMENT & PLAN NOTE
Amoxicillin and Ofloxacin RX. Rocephin IM and Decadron IM today. Medication instructions given with understanding voiced.      Reviewed pathology of current symptoms, medication side effects/risk/benefits/directions on taking medications, and worsening or persistent symptoms that require  follow up in next 2-3 days. Steroid nasal sprays or decongestant use as needed, and Tylenol or Motrin as needed for pain and/or fever and increase fluid intake. Patient was instructed to take antibiotic as directed, complete entire course to avoid antibiotic resistance, and take OTC probiotic with antibiotic to prevent GI upset. All medication benefits and risks discussed with patient. Instructed the patient to return if symptoms persist or worsen.

## 2025-03-20 DIAGNOSIS — F32.A ANXIETY AND DEPRESSION: Primary | ICD-10-CM

## 2025-03-20 DIAGNOSIS — F41.9 ANXIETY AND DEPRESSION: Primary | ICD-10-CM

## 2025-03-20 RX ORDER — SERTRALINE HYDROCHLORIDE 25 MG/1
25 TABLET, FILM COATED ORAL DAILY
Qty: 30 TABLET | Refills: 0 | Status: SHIPPED | OUTPATIENT
Start: 2025-03-20

## 2025-04-15 ENCOUNTER — TELEPHONE (OUTPATIENT)
Dept: FAMILY MEDICINE | Facility: CLINIC | Age: 50
End: 2025-04-15

## 2025-04-15 NOTE — TELEPHONE ENCOUNTER
Attempted to contact patient to schedule routine office visit( last office visit 05/2024), set up mammogram and colonoscopy appointment if she desires to. VM left.

## 2025-06-23 ENCOUNTER — PATIENT MESSAGE (OUTPATIENT)
Dept: FAMILY MEDICINE | Facility: CLINIC | Age: 50
End: 2025-06-23

## 2025-06-24 ENCOUNTER — PATIENT MESSAGE (OUTPATIENT)
Dept: FAMILY MEDICINE | Facility: CLINIC | Age: 50
End: 2025-06-24

## 2025-06-24 DIAGNOSIS — I10 ESSENTIAL HYPERTENSION, BENIGN: ICD-10-CM

## 2025-06-24 RX ORDER — TELMISARTAN 40 MG/1
40 TABLET ORAL
Qty: 90 TABLET | Refills: 1 | OUTPATIENT
Start: 2025-06-24